# Patient Record
Sex: MALE | Race: WHITE | Employment: UNEMPLOYED | URBAN - NONMETROPOLITAN AREA
[De-identification: names, ages, dates, MRNs, and addresses within clinical notes are randomized per-mention and may not be internally consistent; named-entity substitution may affect disease eponyms.]

---
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
## 2023-02-01 PROBLEM — F84.0 ACTIVE AUTISTIC DISORDER: Status: ACTIVE | Noted: 2022-08-13

## 2023-07-06 ENCOUNTER — HOSPITAL ENCOUNTER (EMERGENCY)
Age: 23
Discharge: HOME | End: 2023-07-06
Attending: EMERGENCY MEDICINE
Payer: MEDICAID

## 2023-07-06 VITALS
RESPIRATION RATE: 19 BRPM | OXYGEN SATURATION: 97 % | TEMPERATURE: 99.5 F | SYSTOLIC BLOOD PRESSURE: 130 MMHG | HEART RATE: 90 BPM | DIASTOLIC BLOOD PRESSURE: 85 MMHG

## 2023-07-06 DIAGNOSIS — R56.9 SEIZURE (HCC): Primary | ICD-10-CM

## 2023-07-06 DIAGNOSIS — G40.909 SEIZURE DISORDER (HCC): ICD-10-CM

## 2023-07-06 LAB
ALBUMIN SERPL-MCNC: 4.3 G/DL (ref 3.5–5.2)
ALBUMIN/GLOB SERPL: 1.7 {RATIO} (ref 1–2.5)
ALP SERPL-CCNC: 76 U/L (ref 40–129)
ALT SERPL-CCNC: 16 U/L (ref 5–41)
ANION GAP SERPL CALCULATED.3IONS-SCNC: 17 MMOL/L (ref 9–17)
AST SERPL-CCNC: 17 U/L
BASOPHILS # BLD: 0.1 K/UL (ref 0–0.2)
BASOPHILS NFR BLD: 1 % (ref 0–2)
BILIRUB SERPL-MCNC: 0.3 MG/DL (ref 0.3–1.2)
BUN SERPL-MCNC: 9 MG/DL (ref 6–20)
CALCIUM SERPL-MCNC: 9 MG/DL (ref 8.6–10.4)
CHLORIDE SERPL-SCNC: 103 MMOL/L (ref 98–107)
CO2 SERPL-SCNC: 22 MMOL/L (ref 20–31)
CREAT SERPL-MCNC: 0.88 MG/DL (ref 0.7–1.2)
EOSINOPHIL # BLD: 0.4 K/UL (ref 0–0.4)
EOSINOPHILS RELATIVE PERCENT: 4 % (ref 1–4)
ERYTHROCYTE [DISTWIDTH] IN BLOOD BY AUTOMATED COUNT: 13.7 % (ref 12.5–15.4)
GFR SERPL CREATININE-BSD FRML MDRD: >60 ML/MIN/1.73M2
GLUCOSE BLD-MCNC: 141 MG/DL (ref 75–110)
GLUCOSE SERPL-MCNC: 126 MG/DL (ref 70–99)
HCT VFR BLD AUTO: 43.4 % (ref 41–53)
HGB BLD-MCNC: 14.9 G/DL (ref 13.5–17.5)
LYMPHOCYTES # BLD: 21 % (ref 25–45)
LYMPHOCYTES NFR BLD: 2.1 K/UL (ref 1–4.8)
MAGNESIUM SERPL-MCNC: 2.3 MG/DL (ref 1.6–2.6)
MCH RBC QN AUTO: 31.7 PG (ref 26–34)
MCHC RBC AUTO-ENTMCNC: 34.2 G/DL (ref 31–37)
MCV RBC AUTO: 92.5 FL (ref 80–100)
MONOCYTES NFR BLD: 0.7 K/UL (ref 0.1–1.4)
MONOCYTES NFR BLD: 7 % (ref 2–8)
NEUTROPHILS NFR BLD: 67 % (ref 34–64)
NEUTS SEG NFR BLD: 6.6 K/UL (ref 1.8–7.7)
PLATELET # BLD AUTO: 204 K/UL (ref 140–450)
PMV BLD AUTO: 8.5 FL (ref 6–12)
POTASSIUM SERPL-SCNC: 4.1 MMOL/L (ref 3.7–5.3)
PROT SERPL-MCNC: 6.9 G/DL (ref 6.4–8.3)
RBC # BLD AUTO: 4.7 M/UL (ref 4.5–5.9)
SODIUM SERPL-SCNC: 142 MMOL/L (ref 135–144)
WBC OTHER # BLD: 9.9 K/UL (ref 4.5–13.5)

## 2023-07-06 PROCEDURE — 99284 EMERGENCY DEPT VISIT MOD MDM: CPT

## 2023-07-06 PROCEDURE — 80053 COMPREHEN METABOLIC PANEL: CPT

## 2023-07-06 PROCEDURE — 83735 ASSAY OF MAGNESIUM: CPT

## 2023-07-06 PROCEDURE — 2580000003 HC RX 258: Performed by: EMERGENCY MEDICINE

## 2023-07-06 PROCEDURE — 85027 COMPLETE CBC AUTOMATED: CPT

## 2023-07-06 PROCEDURE — 36415 COLL VENOUS BLD VENIPUNCTURE: CPT

## 2023-07-06 PROCEDURE — 82947 ASSAY GLUCOSE BLOOD QUANT: CPT

## 2023-07-06 RX ORDER — 0.9 % SODIUM CHLORIDE 0.9 %
1000 INTRAVENOUS SOLUTION INTRAVENOUS ONCE
Status: COMPLETED | OUTPATIENT
Start: 2023-07-06 | End: 2023-07-06

## 2023-07-06 RX ORDER — FOLIC ACID 1 MG/1
1 TABLET ORAL DAILY
COMMUNITY

## 2023-07-06 RX ORDER — LORAZEPAM 1 MG/1
1 TABLET ORAL 2 TIMES DAILY
COMMUNITY

## 2023-07-06 RX ADMIN — SODIUM CHLORIDE 1000 ML: 9 INJECTION, SOLUTION INTRAVENOUS at 18:01

## 2023-07-06 ASSESSMENT — PAIN - FUNCTIONAL ASSESSMENT: PAIN_FUNCTIONAL_ASSESSMENT: NONE - DENIES PAIN

## 2023-07-06 NOTE — ED PROVIDER NOTES
[facility] Emergency Department  [id number] [facility]. [facility] [address]  Phone: [phone]  Fax: [address]      Pt Name: Morenita Conway  MRN: [id number]  [address] [place] 2000  Date of evaluation: 7/6/2023    CHIEF COMPLAINT       Chief Complaint   Patient presents with    Seizures     Pt had seizure yesterday; pt.,had 3 today. No meds given by EMS       HISTORY OF PRESENT ILLNESS    Morenita Conway is a 26 y.o. male who presents with a history of seizure per the EMS. Patient does have a history of seizure disorder unknown what medications that he takes or has not taken. They state that he has had several seizures in the last 24 hours    REVIEW OF SYSTEMS     Obtained due to his postictal state. PAST MEDICAL HISTORY    has a past medical history of Autism, COVID-19, Depression, and Epilepsy ([address]). SURGICAL HISTORY      has a past surgical history that includes Forearm fracture surgery (Left). CURRENT MEDICATIONS       Previous Medications    ARIPIPRAZOLE (ABILIFY) 15 MG TABLET    1 tablet nightly Takes at 6 pm    CLOBAZAM (ONFI) 10 MG TABS TABLET    1 tablet 2 times daily. DICYCLOMINE (BENTYL) 20 MG TABLET    Take 1 tablet by mouth 4 times daily as needed (abdominal cramps)    FOLIC ACID (FOLVITE) 1 MG TABLET    Take 1 tablet by mouth daily Unsure of dose    HYDROXYZINE (ATARAX) 50 MG TABLET    Take 50 mg by mouth in the morning, at noon, and at bedtime 2 tabs in morning, 1 tab afternoon    LACOSAMIDE (VIMPAT) 150 MG TABS TABLET    Take 200 mg by mouth 2 times daily. LORAZEPAM (ATIVAN) 1 MG TABLET    Take 1 tablet by mouth in the morning and at bedtime. Max Daily Amount: 2 mg    RISPERIDONE (RISPERDAL) 0.5 MG TABLET    Take 2 tablets by mouth 2 times daily    TRAZODONE (DESYREL) 50 MG TABLET    Take 1 tablet by mouth daily 1-2 QHS PRN       ALLERGIES     is allergic to bee venom and beeswax.     FAMILY HISTORY     He indicated that his

## 2023-07-18 ENCOUNTER — TELEPHONE (OUTPATIENT)
Dept: FAMILY MEDICINE CLINIC | Age: 23
End: 2023-07-18

## 2023-07-18 ENCOUNTER — OFFICE VISIT (OUTPATIENT)
Dept: FAMILY MEDICINE CLINIC | Age: 23
End: 2023-07-18
Payer: MEDICAID

## 2023-07-18 VITALS
WEIGHT: 196.4 LBS | OXYGEN SATURATION: 97 % | HEART RATE: 78 BPM | SYSTOLIC BLOOD PRESSURE: 128 MMHG | HEIGHT: 68 IN | DIASTOLIC BLOOD PRESSURE: 88 MMHG | BODY MASS INDEX: 29.77 KG/M2

## 2023-07-18 DIAGNOSIS — E53.8 FOLIC ACID DEFICIENCY: ICD-10-CM

## 2023-07-18 DIAGNOSIS — F84.0 ACTIVE AUTISTIC DISORDER: ICD-10-CM

## 2023-07-18 DIAGNOSIS — G40.909 NONINTRACTABLE EPILEPSY WITHOUT STATUS EPILEPTICUS, UNSPECIFIED EPILEPSY TYPE (HCC): Primary | ICD-10-CM

## 2023-07-18 DIAGNOSIS — R45.1 AGITATION: ICD-10-CM

## 2023-07-18 PROCEDURE — 99204 OFFICE O/P NEW MOD 45 MIN: CPT

## 2023-07-18 RX ORDER — ARIPIPRAZOLE 15 MG/1
15 TABLET ORAL DAILY
Qty: 30 TABLET | Refills: 0 | Status: SHIPPED | OUTPATIENT
Start: 2023-07-18 | End: 2023-08-17

## 2023-07-18 RX ORDER — FOLIC ACID 1 MG/1
1 TABLET ORAL DAILY
Qty: 30 TABLET | Refills: 2 | Status: SHIPPED | OUTPATIENT
Start: 2023-07-18 | End: 2023-10-16

## 2023-07-18 RX ORDER — RISPERIDONE 1 MG/1
TABLET ORAL
Qty: 90 TABLET | Refills: 0 | Status: SHIPPED | OUTPATIENT
Start: 2023-07-18 | End: 2023-08-17

## 2023-07-18 RX ORDER — DIAZEPAM 2.5 MG/.5ML
0.2 GEL RECTAL
Qty: 15 EACH | Refills: 0 | Status: SHIPPED | OUTPATIENT
Start: 2023-07-18 | End: 2023-07-18

## 2023-07-18 RX ORDER — CLOBAZAM 10 MG/1
20 TABLET ORAL 2 TIMES DAILY
Qty: 120 TABLET | Refills: 0 | Status: SHIPPED | OUTPATIENT
Start: 2023-07-18 | End: 2023-08-17

## 2023-07-18 RX ORDER — LORAZEPAM 1 MG/1
1 TABLET ORAL 2 TIMES DAILY PRN
Qty: 20 TABLET | Refills: 0 | Status: SHIPPED | OUTPATIENT
Start: 2023-07-18 | End: 2023-07-28

## 2023-07-18 SDOH — ECONOMIC STABILITY: FOOD INSECURITY: WITHIN THE PAST 12 MONTHS, YOU WORRIED THAT YOUR FOOD WOULD RUN OUT BEFORE YOU GOT MONEY TO BUY MORE.: NEVER TRUE

## 2023-07-18 SDOH — ECONOMIC STABILITY: TRANSPORTATION INSECURITY
IN THE PAST 12 MONTHS, HAS THE LACK OF TRANSPORTATION KEPT YOU FROM MEDICAL APPOINTMENTS OR FROM GETTING MEDICATIONS?: NO

## 2023-07-18 SDOH — ECONOMIC STABILITY: TRANSPORTATION INSECURITY
IN THE PAST 12 MONTHS, HAS LACK OF TRANSPORTATION KEPT YOU FROM MEETINGS, WORK, OR FROM GETTING THINGS NEEDED FOR DAILY LIVING?: NO

## 2023-07-18 SDOH — ECONOMIC STABILITY: FOOD INSECURITY: WITHIN THE PAST 12 MONTHS, THE FOOD YOU BOUGHT JUST DIDN'T LAST AND YOU DIDN'T HAVE MONEY TO GET MORE.: NEVER TRUE

## 2023-07-18 ASSESSMENT — PATIENT HEALTH QUESTIONNAIRE - PHQ9
SUM OF ALL RESPONSES TO PHQ QUESTIONS 1-9: 0
2. FEELING DOWN, DEPRESSED OR HOPELESS: 0
SUM OF ALL RESPONSES TO PHQ9 QUESTIONS 1 & 2: 0
SUM OF ALL RESPONSES TO PHQ QUESTIONS 1-9: 0
SUM OF ALL RESPONSES TO PHQ QUESTIONS 1-9: 0
1. LITTLE INTEREST OR PLEASURE IN DOING THINGS: 0
SUM OF ALL RESPONSES TO PHQ QUESTIONS 1-9: 0

## 2023-07-18 ASSESSMENT — SOCIAL DETERMINANTS OF HEALTH (SDOH): HOW HARD IS IT FOR YOU TO PAY FOR THE VERY BASICS LIKE FOOD, HOUSING, MEDICAL CARE, AND HEATING?: NOT HARD AT ALL

## 2023-07-18 NOTE — PATIENT INSTRUCTIONS
New Updates for My [facility]    Thank you for choosing US to give you the best care! [facility] ([facility]) is always trying to think of new ways to help their patients. We are asking all patients to try out the new digital registration that is now available through the new [facility], feel free to download today. Via the jean carlos you're now able to update your personal and registration information prior to your upcoming appointment. This will save you time once you arrive at the office to check-in, not to mention your information remains safe!! Many other perks come from signing up for an account, such as:  Requesting refills  Scheduling an appointment  Completing an E-Visit  Sending a message to the office/provider  Having access to your medication list  Paying your bill/copay prior to your appointment  Scheduling your yearly mammogram  Review your test results    If you are not familiar with the [facility], please ask one of us and we will be happy to answer any questions or help you set-up your account.

## 2023-07-18 NOTE — PROGRESS NOTES
as needed for Anxiety for up to 20 doses. Max Daily Amount: 2 mg     Dispense:  20 tablet     Refill:  0    risperiDONE (RISPERDAL) 1 MG tablet     Sig: Take 1 tablet by mouth every morning AND 2 tablets nightly. Dispense:  90 tablet     Refill:  0    folic acid (FOLVITE) 1 MG tablet     Sig: Take 1 tablet by mouth daily Unsure of dose     Dispense:  30 tablet     Refill:  2    cloBAZam (ONFI) 10 MG TABS tablet     Sig: Take 2 tablets by mouth 2 times daily for 30 days. Max Daily Amount: 40 mg     Dispense:  120 tablet     Refill:  0    ARIPiprazole (ABILIFY) 15 MG tablet     Sig: Take 1 tablet by mouth daily Takes at 6 pm     Dispense:  30 tablet     Refill:  0       Reviewed health maintenance, prior labs and imaging. Patient given educational materials - see patient instructions. Discussed use, benefit, and side effects of prescribed medications. Barriers to medication compliance addressed. All patient questions answered. Pt voiced understanding to plan of care. Instructed to continue medications as discussed, healthy diet and exercise. Patient agreed with treatment plan. Follow up as directed below. This note is created with the assistance of a speech-recognition program. While intending to generate a document that actually reflects the content of the visit, no guarantees can be provided that every mistake has been identified and corrected by editing.     Electronically signed by RILEY Gonzalez CNP, APRN-CNP on 7/20/2023 at 5:25 PM

## 2023-07-20 ASSESSMENT — ENCOUNTER SYMPTOMS
SINUS PAIN: 0
SINUS PRESSURE: 0
ABDOMINAL PAIN: 0
VOMITING: 0
WHEEZING: 0
EYE REDNESS: 0
SORE THROAT: 0
COUGH: 0
EYE ITCHING: 0
DIARRHEA: 0
EYE DISCHARGE: 0
TROUBLE SWALLOWING: 0
CHEST TIGHTNESS: 0
NAUSEA: 0
SHORTNESS OF BREATH: 0

## 2023-07-20 NOTE — ASSESSMENT & PLAN NOTE
Uncontrolled, continue current medications, continue current treatment plan, medication adherence emphasized and lifestyle modifications recommended. Will refer patient to neurology for further management.

## 2023-08-14 DIAGNOSIS — F84.0 ACTIVE AUTISTIC DISORDER: ICD-10-CM

## 2023-08-14 DIAGNOSIS — G40.909 NONINTRACTABLE EPILEPSY WITHOUT STATUS EPILEPTICUS, UNSPECIFIED EPILEPSY TYPE (HCC): ICD-10-CM

## 2023-08-14 RX ORDER — ARIPIPRAZOLE 15 MG/1
TABLET ORAL
Qty: 28 TABLET | Refills: 0 | Status: SHIPPED | OUTPATIENT
Start: 2023-08-14

## 2023-08-14 RX ORDER — RISPERIDONE 1 MG/1
TABLET ORAL
Qty: 90 TABLET | Refills: 0 | Status: SHIPPED | OUTPATIENT
Start: 2023-08-14 | End: 2023-09-13

## 2023-08-14 RX ORDER — CLOBAZAM 10 MG/1
TABLET ORAL
Qty: 112 TABLET | OUTPATIENT
Start: 2023-08-14

## 2023-08-14 NOTE — TELEPHONE ENCOUNTER
Seizure medications need to be prescribed and managed by Neurology as he continues to have frequent seizures. Patient also needs to establish care with mental health for medication management.

## 2023-08-14 NOTE — TELEPHONE ENCOUNTER
Alesia Delgadillo is calling to request a refill on the following medication(s):    Medication Request:  Requested Prescriptions     Pending Prescriptions Disp Refills    ARIPiprazole (ABILIFY) 15 MG tablet [Pharmacy Med Name: ARIPiprazole 15MG TABS*] 28 tablet      Sig: TAKE 1 TABLET BY MOUTH DAILY TAKES AT 6 IN THE EVENING    cloBAZam (ONFI) 10 MG TABS tablet [Pharmacy Med Name: cloBAZam 10MG TABS*] 112 tablet      Sig: TAKE 2 TABLETS BY MOUTH TWICE A DAY    risperiDONE (RISPERDAL) 1 MG tablet [Pharmacy Med Name: risperiDONE 1MG TABS*] 84 tablet      Sig: TAKE 1 TABLET BY MOUTH EVERY MORNING AND 2 TABLETS NIGHTLY.        Last Visit Date (If Applicable):  5/34/8928    Next Visit Date:    1/23/2024

## 2023-08-21 DIAGNOSIS — G40.909 NONINTRACTABLE EPILEPSY WITHOUT STATUS EPILEPTICUS, UNSPECIFIED EPILEPSY TYPE (HCC): ICD-10-CM

## 2023-08-21 RX ORDER — CLOBAZAM 10 MG/1
TABLET ORAL
Qty: 112 TABLET | OUTPATIENT
Start: 2023-08-21

## 2023-08-21 NOTE — TELEPHONE ENCOUNTER
Sweetie Hills is calling to request a refill on the following medication(s):    Medication Request:  Requested Prescriptions     Pending Prescriptions Disp Refills    cloBAZam (ONFI) 10 MG TABS tablet [Pharmacy Med Name: cloBAZam 10MG TABS*] 112 tablet      Sig: TAKE 2 TABLETS BY MOUTH TWICE A DAY       Last Visit Date (If Applicable):  8/89/4954    Next Visit Date:    1/23/2024

## 2023-09-12 DIAGNOSIS — F84.0 ACTIVE AUTISTIC DISORDER: ICD-10-CM

## 2023-09-12 RX ORDER — RISPERIDONE 1 MG/1
TABLET ORAL
Qty: 90 TABLET | Refills: 2 | Status: SHIPPED | OUTPATIENT
Start: 2023-09-12 | End: 2023-12-11

## 2023-09-12 RX ORDER — ARIPIPRAZOLE 15 MG/1
TABLET ORAL
Qty: 28 TABLET | Refills: 1 | Status: SHIPPED | OUTPATIENT
Start: 2023-09-12

## 2023-10-31 DIAGNOSIS — E53.8 FOLIC ACID DEFICIENCY: ICD-10-CM

## 2023-11-01 RX ORDER — FOLIC ACID 1 MG/1
1000 TABLET ORAL DAILY
Qty: 28 TABLET | Refills: 0 | Status: SHIPPED | OUTPATIENT
Start: 2023-11-01

## 2023-11-01 NOTE — TELEPHONE ENCOUNTER
Requested Prescriptions     Pending Prescriptions Disp Refills    folic acid (FOLVITE) 1 MG tablet [Pharmacy Med Name: Folic Acid 1MG TABS] 28 tablet 0     Sig: TAKE 1 TABLET BY MOUTH DAILY

## 2023-11-28 DIAGNOSIS — E53.8 FOLIC ACID DEFICIENCY: ICD-10-CM

## 2023-11-28 RX ORDER — FOLIC ACID 1 MG/1
1000 TABLET ORAL DAILY
Qty: 28 TABLET | Refills: 5 | Status: SHIPPED | OUTPATIENT
Start: 2023-11-28

## 2023-11-28 NOTE — TELEPHONE ENCOUNTER
Jerrell Bell is calling to request a refill on the following medication(s):    Medication Request:  Requested Prescriptions     Pending Prescriptions Disp Refills    folic acid (FOLVITE) 1 MG tablet [Pharmacy Med Name: Folic Acid 1MG TABS] 28 tablet 0     Sig: TAKE 1 TABLET BY MOUTH DAILY       Last Visit Date (If Applicable):  7/18/2023    Next Visit Date:    1/23/2024

## 2024-07-07 ENCOUNTER — HOSPITAL ENCOUNTER (INPATIENT)
Age: 24
LOS: 2 days | Discharge: HOME | DRG: 101 | End: 2024-07-09
Attending: EMERGENCY MEDICINE | Admitting: PSYCHIATRY & NEUROLOGY
Payer: COMMERCIAL

## 2024-07-07 ENCOUNTER — APPOINTMENT (OUTPATIENT)
Dept: CT IMAGING | Age: 24
DRG: 101 | End: 2024-07-07
Payer: COMMERCIAL

## 2024-07-07 ENCOUNTER — APPOINTMENT (OUTPATIENT)
Dept: GENERAL RADIOLOGY | Age: 24
DRG: 101 | End: 2024-07-07
Payer: COMMERCIAL

## 2024-07-07 DIAGNOSIS — G40.919 BREAKTHROUGH SEIZURE (HCC): Primary | ICD-10-CM

## 2024-07-07 PROBLEM — G40.909 SEIZURE DISORDER (HCC): Status: ACTIVE | Noted: 2024-07-07

## 2024-07-07 LAB
ANION GAP SERPL CALCULATED.3IONS-SCNC: 17 MMOL/L (ref 9–16)
BUN SERPL-MCNC: 11 MG/DL (ref 6–20)
CALCIUM SERPL-MCNC: 9.2 MG/DL (ref 8.6–10.4)
CHLORIDE SERPL-SCNC: 102 MMOL/L (ref 98–107)
CO2 SERPL-SCNC: 22 MMOL/L (ref 20–31)
CREAT SERPL-MCNC: 1 MG/DL (ref 0.7–1.2)
GFR, ESTIMATED: >90 ML/MIN/1.73M2
GLUCOSE BLD-MCNC: 80 MG/DL (ref 75–110)
GLUCOSE SERPL-MCNC: 100 MG/DL (ref 74–99)
MAGNESIUM SERPL-MCNC: 2 MG/DL (ref 1.6–2.6)
POTASSIUM SERPL-SCNC: 4 MMOL/L (ref 3.7–5.3)
SODIUM SERPL-SCNC: 141 MMOL/L (ref 136–145)
TROPONIN I SERPL HS-MCNC: 9 NG/L (ref 0–22)
VALPROATE SERPL-MCNC: 114 UG/ML (ref 50–125)

## 2024-07-07 PROCEDURE — 95819 EEG AWAKE AND ASLEEP: CPT

## 2024-07-07 PROCEDURE — 6360000002 HC RX W HCPCS

## 2024-07-07 PROCEDURE — 99223 1ST HOSP IP/OBS HIGH 75: CPT | Performed by: PSYCHIATRY & NEUROLOGY

## 2024-07-07 PROCEDURE — 93005 ELECTROCARDIOGRAM TRACING: CPT | Performed by: EMERGENCY MEDICINE

## 2024-07-07 PROCEDURE — 94761 N-INVAS EAR/PLS OXIMETRY MLT: CPT

## 2024-07-07 PROCEDURE — 80048 BASIC METABOLIC PNL TOTAL CA: CPT

## 2024-07-07 PROCEDURE — 71045 X-RAY EXAM CHEST 1 VIEW: CPT

## 2024-07-07 PROCEDURE — 6370000000 HC RX 637 (ALT 250 FOR IP)

## 2024-07-07 PROCEDURE — 82947 ASSAY GLUCOSE BLOOD QUANT: CPT

## 2024-07-07 PROCEDURE — 99285 EMERGENCY DEPT VISIT HI MDM: CPT

## 2024-07-07 PROCEDURE — 84484 ASSAY OF TROPONIN QUANT: CPT

## 2024-07-07 PROCEDURE — 95819 EEG AWAKE AND ASLEEP: CPT | Performed by: PSYCHIATRY & NEUROLOGY

## 2024-07-07 PROCEDURE — 1200000000 HC SEMI PRIVATE

## 2024-07-07 PROCEDURE — 70450 CT HEAD/BRAIN W/O DYE: CPT

## 2024-07-07 PROCEDURE — 2580000003 HC RX 258

## 2024-07-07 PROCEDURE — 83735 ASSAY OF MAGNESIUM: CPT

## 2024-07-07 PROCEDURE — 80164 ASSAY DIPROPYLACETIC ACD TOT: CPT

## 2024-07-07 RX ORDER — ONDANSETRON 4 MG/1
4 TABLET, ORALLY DISINTEGRATING ORAL EVERY 8 HOURS PRN
Status: DISCONTINUED | OUTPATIENT
Start: 2024-07-07 | End: 2024-07-09 | Stop reason: HOSPADM

## 2024-07-07 RX ORDER — POLYETHYLENE GLYCOL 3350 17 G/17G
17 POWDER, FOR SOLUTION ORAL DAILY PRN
Status: DISCONTINUED | OUTPATIENT
Start: 2024-07-07 | End: 2024-07-09 | Stop reason: HOSPADM

## 2024-07-07 RX ORDER — SODIUM CHLORIDE 9 MG/ML
INJECTION, SOLUTION INTRAVENOUS PRN
Status: DISCONTINUED | OUTPATIENT
Start: 2024-07-07 | End: 2024-07-09 | Stop reason: HOSPADM

## 2024-07-07 RX ORDER — ACETAMINOPHEN 650 MG/1
650 SUPPOSITORY RECTAL EVERY 6 HOURS PRN
Status: DISCONTINUED | OUTPATIENT
Start: 2024-07-07 | End: 2024-07-09 | Stop reason: HOSPADM

## 2024-07-07 RX ORDER — ENOXAPARIN SODIUM 100 MG/ML
40 INJECTION SUBCUTANEOUS DAILY
Status: DISCONTINUED | OUTPATIENT
Start: 2024-07-07 | End: 2024-07-09 | Stop reason: HOSPADM

## 2024-07-07 RX ORDER — VALPROIC ACID 250 MG/1
1000 CAPSULE, LIQUID FILLED ORAL 2 TIMES DAILY
Status: DISCONTINUED | OUTPATIENT
Start: 2024-07-07 | End: 2024-07-09 | Stop reason: HOSPADM

## 2024-07-07 RX ORDER — SODIUM CHLORIDE 0.9 % (FLUSH) 0.9 %
5-40 SYRINGE (ML) INJECTION EVERY 12 HOURS SCHEDULED
Status: DISCONTINUED | OUTPATIENT
Start: 2024-07-07 | End: 2024-07-09 | Stop reason: HOSPADM

## 2024-07-07 RX ORDER — VALPROIC ACID 250 MG/1
750 CAPSULE, LIQUID FILLED ORAL DAILY
Status: DISCONTINUED | OUTPATIENT
Start: 2024-07-07 | End: 2024-07-09 | Stop reason: HOSPADM

## 2024-07-07 RX ORDER — CLOBAZAM 10 MG/1
20 TABLET ORAL 2 TIMES DAILY
Status: DISCONTINUED | OUTPATIENT
Start: 2024-07-07 | End: 2024-07-09 | Stop reason: HOSPADM

## 2024-07-07 RX ORDER — POTASSIUM CHLORIDE 20 MEQ/1
40 TABLET, EXTENDED RELEASE ORAL PRN
Status: DISCONTINUED | OUTPATIENT
Start: 2024-07-07 | End: 2024-07-09 | Stop reason: HOSPADM

## 2024-07-07 RX ORDER — RISPERIDONE 1 MG/1
1 TABLET ORAL DAILY
Status: DISCONTINUED | OUTPATIENT
Start: 2024-07-07 | End: 2024-07-09 | Stop reason: HOSPADM

## 2024-07-07 RX ORDER — ARIPIPRAZOLE 15 MG/1
15 TABLET ORAL DAILY
Status: DISCONTINUED | OUTPATIENT
Start: 2024-07-07 | End: 2024-07-09 | Stop reason: HOSPADM

## 2024-07-07 RX ORDER — ACETAMINOPHEN 325 MG/1
650 TABLET ORAL EVERY 6 HOURS PRN
Status: DISCONTINUED | OUTPATIENT
Start: 2024-07-07 | End: 2024-07-09 | Stop reason: HOSPADM

## 2024-07-07 RX ORDER — MAGNESIUM SULFATE IN WATER 40 MG/ML
2000 INJECTION, SOLUTION INTRAVENOUS PRN
Status: DISCONTINUED | OUTPATIENT
Start: 2024-07-07 | End: 2024-07-09 | Stop reason: HOSPADM

## 2024-07-07 RX ORDER — ONDANSETRON 2 MG/ML
4 INJECTION INTRAMUSCULAR; INTRAVENOUS EVERY 6 HOURS PRN
Status: DISCONTINUED | OUTPATIENT
Start: 2024-07-07 | End: 2024-07-09 | Stop reason: HOSPADM

## 2024-07-07 RX ORDER — SODIUM CHLORIDE 0.9 % (FLUSH) 0.9 %
5-40 SYRINGE (ML) INJECTION PRN
Status: DISCONTINUED | OUTPATIENT
Start: 2024-07-07 | End: 2024-07-09 | Stop reason: HOSPADM

## 2024-07-07 RX ORDER — LACOSAMIDE 100 MG/1
300 TABLET ORAL 2 TIMES DAILY
Status: DISCONTINUED | OUTPATIENT
Start: 2024-07-07 | End: 2024-07-09 | Stop reason: HOSPADM

## 2024-07-07 RX ORDER — RISPERIDONE 2 MG/1
2 TABLET ORAL NIGHTLY
Status: DISCONTINUED | OUTPATIENT
Start: 2024-07-07 | End: 2024-07-09 | Stop reason: HOSPADM

## 2024-07-07 RX ORDER — POTASSIUM CHLORIDE 7.45 MG/ML
10 INJECTION INTRAVENOUS PRN
Status: DISCONTINUED | OUTPATIENT
Start: 2024-07-07 | End: 2024-07-09 | Stop reason: HOSPADM

## 2024-07-07 RX ADMIN — ENOXAPARIN SODIUM 40 MG: 100 INJECTION SUBCUTANEOUS at 10:28

## 2024-07-07 RX ADMIN — VALPROIC ACID 1000 MG: 250 CAPSULE, LIQUID FILLED ORAL at 20:57

## 2024-07-07 RX ADMIN — LACOSAMIDE 300 MG: 100 TABLET, FILM COATED ORAL at 20:57

## 2024-07-07 RX ADMIN — CLOBAZAM 20 MG: 10 TABLET ORAL at 20:57

## 2024-07-07 RX ADMIN — VALPROIC ACID 1000 MG: 250 CAPSULE, LIQUID FILLED ORAL at 10:27

## 2024-07-07 RX ADMIN — SODIUM CHLORIDE, PRESERVATIVE FREE 10 ML: 5 INJECTION INTRAVENOUS at 21:04

## 2024-07-07 RX ADMIN — RISPERIDONE 1 MG: 2 TABLET, FILM COATED ORAL at 10:28

## 2024-07-07 RX ADMIN — LACOSAMIDE 300 MG: 100 TABLET, FILM COATED ORAL at 10:27

## 2024-07-07 RX ADMIN — CLOBAZAM 20 MG: 10 TABLET ORAL at 10:27

## 2024-07-07 RX ADMIN — ACETAMINOPHEN 650 MG: 325 TABLET ORAL at 11:11

## 2024-07-07 RX ADMIN — RISPERIDONE 2 MG: 2 TABLET, FILM COATED ORAL at 20:57

## 2024-07-07 ASSESSMENT — PAIN - FUNCTIONAL ASSESSMENT: PAIN_FUNCTIONAL_ASSESSMENT: 0-10

## 2024-07-07 ASSESSMENT — PAIN SCALES - GENERAL
PAINLEVEL_OUTOF10: 10
PAINLEVEL_OUTOF10: 10

## 2024-07-07 NOTE — PROCEDURES
Daily Kwan Thomas MD   40 mg at 07/07/24 1028    cloBAZam (ONFI) tablet 20 mg  20 mg Oral BID Kwan Thomas MD   20 mg at 07/07/24 1027    lacosamide (VIMPAT) tablet 300 mg  300 mg Oral BID Kwan Thomas MD   300 mg at 07/07/24 1027    risperiDONE (RISPERDAL) tablet 1 mg  1 mg Oral Daily Kwan Thomas MD   1 mg at 07/07/24 1028    risperiDONE (RISPERDAL) tablet 2 mg  2 mg Oral Nightly Kwan Thomas MD        ARIPiprazole (ABILIFY) tablet 15 mg  15 mg Oral Daily Kwan Thomas MD        valproic acid (DEPAKENE) capsule 1,000 mg  1,000 mg Oral BID Josué Davies MD   1,000 mg at 07/07/24 1027    valproic acid (DEPAKENE) capsule 750 mg  750 mg Oral Daily Josué Davies MD         Current Outpatient Medications   Medication Sig Dispense Refill    folic acid (FOLVITE) 1 MG tablet TAKE 1 TABLET BY MOUTH DAILY 28 tablet 5    risperiDONE (RISPERDAL) 1 MG tablet TAKE 1 TABLET BY MOUTH EVERY MORNING AND 2 TABLETS NIGHTLY. 90 tablet 2    ARIPiprazole (ABILIFY) 15 MG tablet TAKE 1 TABLET BY MOUTH DAILY TAKES AT 6 IN THE EVENING 28 tablet 1    Triamcinolone Acetonide (NASACORT ALLERGY 24HR NA) by Nasal route      diazePAM (DIASTAT) 2.5 MG GEL Place 15 mg rectally once as needed (Seizure activity) for up to 1 dose. Max Daily Amount: 15 mg 15 each 0    cloBAZam (ONFI) 10 MG TABS tablet Take 2 tablets by mouth 2 times daily for 30 days. Max Daily Amount: 40 mg 120 tablet 0    lacosamide (VIMPAT) 150 MG TABS tablet Take 200 mg by mouth 2 times daily.        Technical Description: This is a 21 channel digital EEG recording with time-locked video. Electrodes were placed in accordance with the 10-20 International System of Electrode Placement. Single lead EKG monitoring as well as temporal electrodes were included.    The patient was not sleep deprived. This recording was obtained during wakefulness.    EEG Description: The dominant background activity during maximal recorded wakefulness 
Denies tobacco/alcohol use

## 2024-07-07 NOTE — ED NOTES
Attempted to medicate and go over patient mediation. Patient is sleeping. Patient will briefly wake to verbal stimuli and sternal rub and then go back to sleep. Patient wakes to loud verbal stimuli and will say \"what\" then fall back asleep. RN notified Suzie ARRIAGA. Patient unable to stay awake long enough to verify medications/administration. Guards at bedside reports this is ongoing since coming back from [place].   
ED to inpatient nurses report      Chief Complaint:  Chief Complaint   Patient presents with    Seizures     Present to ED from: [facility]    MOA:     LOC: alert and orientated to name, place, date  Mobility: Requires assistance * 1  Oxygen Baseline: RA    Current needs required: RA   Pending ED orders: none  Present condition: stable, alert and oriented     Why did the patient come to the ED? Seizures   What is the plan? Admission, EEG  Any procedures or intervention occur? Labs, consults, VS  Any safety concerns?? Seizure precautions    Mental Status:  Level of Consciousness: Alert (0)    Psych Assessment:   Psychosocial  Psychosocial (WDL): Within Defined Limits  Vital signs   Vitals:    07/07/24 0415 07/07/24 0442   BP: 139/85    Pulse: 94 94   Resp: 16    Temp: 98.2 °F (36.8 °C)    TempSrc: Oral    SpO2: 96%    Weight: 90.7 kg (200 lb)    Height: 1.829 m (6')         Vitals:  Patient Vitals for the past 24 hrs:   BP Temp Temp src Pulse Resp SpO2 Height Weight   07/07/24 0442 -- -- -- 94 -- -- -- --   07/07/24 0415 139/85 98.2 °F (36.8 °C) Oral 94 16 96 % 1.829 m (6') 90.7 kg (200 lb)      Visit Vitals  /85   Pulse 94   Temp 98.2 °F (36.8 °C) (Oral)   Resp 16   Ht 1.829 m (6')   Wt 90.7 kg (200 lb)   SpO2 96%   BMI 27.12 kg/m²        LDAs:   Peripheral IV 07/07/24 Right Antecubital (Active)       Ambulatory Status:  No data recorded    Diagnosis:  DISPOSITION Admitted 07/07/2024 05:15:44 AM   Final diagnoses:   None        Consults:  IP CONSULT TO NEUROLOGY     Treatment Team:   Treatment Team: Attending Provider: Edenilson Barriga MD; Resident: Brittany Jaime MD; Registered Nurse: Duane, Savannah, RN; Consulting Physician: Edenilson Barriga MD    Treatment:  ED Course as of 07/07/24 0518   Sun Jul 07, 2024   0507 Magnesium: 2.0 [NS]      ED Course User Index  [NS] Brittany Jaime MD          Skin Assessment:        Pain Score:  Pain Assessment  Pain Assessment: 0-10  Pain Level: 
ED to inpatient nurses report      Chief Complaint:  Chief Complaint   Patient presents with    Seizures     Present to ED from: group home to detention to [facility] ED    MOA:     LOC: alert and orientated to name and place  Mobility: Independent  Oxygen Baseline: room air    Current needs required: room air   Pending ED orders: none  Present condition: stable    Why did the patient come to the ED? Seizure occurring at detention  What is the plan? EEG, meds  Any procedures or intervention occur? IV, CT, EEG, oral home meds  Any safety concerns?? none    Mental Status:  Level of Consciousness: Alert (0)    Psych Assessment:   Psychosocial  Psychosocial (WDL): Within Defined Limits  Vital signs   Vitals:    07/07/24 1346 07/07/24 1356 07/07/24 1415 07/07/24 1430   BP: 91/60 92/62 92/65 98/62   Pulse: 60 59 56 55   Resp: 13 13 13 12   Temp:       TempSrc:       SpO2: 97% 97% 97% 97%   Weight:       Height:            Vitals:  Patient Vitals for the past 24 hrs:   BP Temp Temp src Pulse Resp SpO2 Height Weight   07/07/24 1430 98/62 -- -- 55 12 97 % -- --   07/07/24 1415 92/65 -- -- 56 13 97 % -- --   07/07/24 1356 92/62 -- -- 59 13 97 % -- --   07/07/24 1346 91/60 -- -- 60 13 97 % -- --   07/07/24 1331 (!) 91/54 -- -- 63 12 96 % -- --   07/07/24 1246 (!) 100/48 -- -- 61 11 96 % -- --   07/07/24 1131 127/78 -- -- 76 12 99 % -- --   07/07/24 1116 126/85 -- -- 73 15 98 % -- --   07/07/24 1101 127/71 -- -- 73 12 99 % -- --   07/07/24 1046 (!) 124/92 -- -- 83 13 96 % -- --   07/07/24 1020 -- -- -- 69 13 95 % -- --   07/07/24 1015 105/61 -- -- 51 11 98 % -- --   07/07/24 0955 100/64 -- -- (!) 47 10 99 % -- --   07/07/24 0940 (!) 98/59 -- -- 56 11 97 % -- --   07/07/24 0925 96/62 -- -- (!) 47 11 99 % -- --   07/07/24 0915 (!) 100/53 -- -- 51 12 97 % -- --   07/07/24 0855 103/66 -- -- 57 11 96 % -- --   07/07/24 0840 (!) 100/55 -- -- 58 11 97 % -- --   07/07/24 0825 99/62 -- -- 53 11 97 % -- --   07/07/24 0810 106/71 -- -- 55 12 96 % -- -- 
EEG at bedside  
EEG called, will be coming down to place patient onto EEG.   
Juanr received call from [facility] RN reporting patient's guardian/mother Wyatt Bell [phone] was told patient's there & she's trying to find him. Writer verified guardianship on [facility] site. Writer contacted Wyatt who stated patient resides at [address] [place] [address]. Wyatt stated patient should've never been arrested & wouldn't have had the normal staff been there. Wyatt asked for patient's legal status.  stated per RN notes, officers left with instructions for patient to appear in [facility] Court tomorrow in lieu of senior care. Writeellie stated, per RN note, warrant for arrest will be issued if patient doesn't present to court or hospital staff don't inform court of patient's admission status. Wyatt stated patient's normal group home staff, Harrison Jensen [phone], was out of state when this morning's incident happened. Wyatt stated Harrison is  & in charge of the home. Wyatt requested writer contact Harrison with update & contact her with decision/instruction to either contact the sergeant today or wait & contact the court tomorrow. Writer contacted Harrison, informed him patient's in [facility] ED as a boarder waiting for a bed upstairs. Harrison stated patient does better if he's with someone he knows & trusts. Harrison stated if the ED needs home staff to present to the ED or to speak to him, to call him any time. Harrison stated patient calms down just by his voice. Harrison stated intent to contact Wyatt to coordinate communication with the courts.  left voicemail for floor social work.   
Neuro at bedside  
Neuro residents at bedside. Not concerned about patient's HR dropping. EEG abnormal, residents spoke with neuro attending and no LTME necessary at this time.  
Neurology at bedside to evaluate pt  
POCT Glucose 116 mg/dl.  
Patient given warm meal tray. Patient resting comfortably and in no acute distress. Respirations even and nonlabored. Patient denies any needs at this time. Bed in lowest position. Call light within reach. Will continue to monitor.  
Patient's HR dropping into 40s, hit 39 bpm at one point. Admitting team notified.  
Per  over the phone from the nursing home, patient is initially from a group home where he resides due to his hx autism. Patient threw something at  and the worker wanted to press charges so that is why the patient was arrested. However, due to patient's hx autism, they are doubting this charge will hold up and are willing to release the patient with court order for tomorrow.  is requesting we consult case management to verify he can go back to the group home or if he needs another home.  
Pt arrived to ED through police custody from retirement   stated he had a seizure and has a hx of seizures  Pt stated he did hit his head  Pt stated he takes daily medication for his seizures  Pt connected to monitor, bp and pulse ox  IV access started with labs drawn  EKG completed and charted   Seizure precautions in place   Pt appears to be in no acute distress at this time  
Pt released from police custody from the ER. Pt is to appear in court tomorrow 07/08 at [facility]. Pt or RN will need to call the court to inform them of patient's admission status or pt will receive a warrant for his arrest per request by law enforcement. Pt mental status is altered at this time and is unable to do so himself, if more alert, RN will need to remind pt to call.    [facility] court  [phone]  
Report given to Suzie ARRIAGA, all questions answered   
Warm blankets given. Patient resting comfortably and in no acute distress. Respirations even and nonlabored. Patient denies any needs at this time. Bed in lowest position. Call light within reach. Will continue to monitor.  
1.829 m (6')   Wt 90.7 kg (200 lb)   SpO2 95%   BMI 27.12 kg/m²        LDAs:   Peripheral IV 07/07/24 Right Antecubital (Active)       Ambulatory Status:  No data recorded    Diagnosis:  DISPOSITION Admitted 07/07/2024 05:15:44 AM   Final diagnoses:   None        Consults:  IP CONSULT TO NEUROLOGY     Treatment Team:   Treatment Team: Attending Provider: Edenilson Barriga MD; Consulting Physician: Edenilson Barriga MD; Registered Nurse: Suzie Graham RN    Treatment:  ED Course as of 07/07/24 1154   Sun Jul 07, 2024   0507 Magnesium: 2.0 [NS]      ED Course User Index  [NS] Brittany Jaime MD          Skin Assessment:        Pain Score:  Pain Assessment  Pain Assessment: 0-10  Pain Level: 10      SOCIAL HISTORY       Social History     Socioeconomic History    Marital status:    Tobacco Use    Smoking status: Former     Types: Cigarettes    Smokeless tobacco: Never   Vaping Use    Vaping Use: Never used   Substance and Sexual Activity    Alcohol use: Never    Drug use: Not Currently     Types: Marijuana (Weed)     Social Determinants of Health     Financial Resource Strain: Low Risk  (7/18/2023)    Overall Financial Resource Strain (CARDIA)     Difficulty of Paying Living Expenses: Not hard at all   Transportation Needs: No Transportation Needs (7/18/2023)    PRAPARE - Transportation     Lack of Transportation (Medical): No     Lack of Transportation (Non-Medical): No   Housing Stability: Unknown (2/7/2023)    Housing Stability Vital Sign     Unstable Housing in the Last Year: No       FAMILY HISTORY       Family History   Problem Relation Age of Onset    Depression Mother     Other Mother        ALLERGIES     Bee venom and Beeswax    CURRENT MEDICATIONS       Previous Medications    ARIPIPRAZOLE (ABILIFY) 15 MG TABLET    TAKE 1 TABLET BY MOUTH DAILY TAKES AT 6 IN THE EVENING    CLOBAZAM (ONFI) 10 MG TABS TABLET    Take 2 tablets by mouth 2 times daily for 30 days. Max Daily Amount: 40

## 2024-07-07 NOTE — ED PROVIDER NOTES
[facility]  Emergency Department Encounter  Emergency Medicine Resident     Pt Name:Jerrell Bell  MRN: [id number]  Birthdate 2000  Date of evaluation: 7/7/24  PCP:  No primary care provider on file.  Note Started: 4:37 AM EDT      CHIEF COMPLAINT       Chief Complaint   Patient presents with    Seizures       HISTORY OF PRESENT ILLNESS  (Location/Symptom, Timing/Onset, Context/Setting, Quality, Duration, Modifying Factors, Severity.)      Jerrell Bell is a 24 y.o. male who presents with seizure-like activity while in custody.  Patient was seated in the chair and told afterwards he was going to have a seizure.  He was placed on the ground in the recovery position.  As per report seizure-like activity for approximately 1 minute.  Complaining of chest pain after after seizure activity states he is currently denies any chest pain or shortness of breath.  Patient states he is on Depakote which she took yesterday.  History of epilepsy autism.     PAST MEDICAL / SURGICAL / SOCIAL / FAMILY HISTORY      has a past medical history of Autism, COVID-19, Depression, and Epilepsy (HCC).     has a past surgical history that includes Forearm fracture surgery (Left).    Social History     Socioeconomic History    Marital status:      Spouse name: Not on file    Number of children: Not on file    Years of education: Not on file    Highest education level: Not on file   Occupational History    Not on file   Tobacco Use    Smoking status: Former     Types: Cigarettes    Smokeless tobacco: Never   Vaping Use    Vaping Use: Never used   Substance and Sexual Activity    Alcohol use: Never    Drug use: Not Currently     Types: Marijuana (Weed)    Sexual activity: Not on file   Other Topics Concern    Not on file   Social History Narrative    Not on file     Social Determinants of Health     Financial Resource Strain: Low Risk  (7/18/2023)    Overall Financial Resource Strain (CARDIA)     Difficulty of

## 2024-07-07 NOTE — ED PROVIDER NOTES
[facility]     Emergency Department     Faculty Attestation    I performed a history and physical examination of the patient and discussed management with the resident. I have reviewed and agree with the resident’s findings including all diagnostic interpretations, and treatment plans as written. Any areas of disagreement are noted on the chart. I was personally present for the key portions of any procedures. I have documented in the chart those procedures where I was not present during the key portions. I have reviewed the emergency nurses triage note. I agree with the chief complaint, past medical history, past surgical history, allergies, medications, social and family history as documented unless otherwise noted below. Documentation of the HPI, Physical Exam and Medical Decision Making performed by scribvenkat is based on my personal performance of the HPI, PE and MDM. For Physician Assistant/ Nurse Practitioner cases/documentation I have personally evaluated this patient and have completed at least one if not all key elements of the E/M (history, physical exam, and MDM). Additional findings are as noted.    Note Started: 4:20 AM EDT     25 yo M reported seizure with cp, no fever, no injury, no incontinence, patient reports being on Depakote,   PE vss flat affect, EOMI, no tongue abrasion, no cervical tenderness, crepitus, or deformity,  Radial pulse= d pedal pulse=     Trop 9, neuro consult, neuro admit,   Ct head -  EKG Interpretation    Interpreted by me  Normal sinus, heart rate 91, no ischemia, normal axis, QTc 425    CRITICAL CARE: There was a high probability of clinically significant/life threatening deterioration in this patient's condition which required my urgent intervention.  Total critical care time was 5 minutes.  This excludes any time for separately reportable procedures.       Julien Richards DO  07/07/24 0422

## 2024-07-08 LAB
ALBUMIN SERPL-MCNC: 4.2 G/DL (ref 3.5–5.2)
ALBUMIN/GLOB SERPL: 2 {RATIO} (ref 1–2.5)
ALP SERPL-CCNC: 64 U/L (ref 40–129)
ALT SERPL-CCNC: 18 U/L (ref 10–50)
ANION GAP SERPL CALCULATED.3IONS-SCNC: 12 MMOL/L (ref 9–16)
AST SERPL-CCNC: 41 U/L (ref 10–50)
BASOPHILS # BLD: 0.04 K/UL (ref 0–0.2)
BASOPHILS NFR BLD: 1 % (ref 0–2)
BILIRUB SERPL-MCNC: 0.9 MG/DL (ref 0–1.2)
BUN SERPL-MCNC: 17 MG/DL (ref 6–20)
CALCIUM SERPL-MCNC: 8.8 MG/DL (ref 8.6–10.4)
CHLORIDE SERPL-SCNC: 105 MMOL/L (ref 98–107)
CO2 SERPL-SCNC: 22 MMOL/L (ref 20–31)
CREAT SERPL-MCNC: 0.9 MG/DL (ref 0.7–1.2)
EKG ATRIAL RATE: 91 BPM
EKG P AXIS: 58 DEGREES
EKG P-R INTERVAL: 152 MS
EKG Q-T INTERVAL: 346 MS
EKG QRS DURATION: 86 MS
EKG QTC CALCULATION (BAZETT): 425 MS
EKG R AXIS: 15 DEGREES
EKG T AXIS: 27 DEGREES
EKG VENTRICULAR RATE: 91 BPM
EOSINOPHIL # BLD: 0.25 K/UL (ref 0–0.44)
EOSINOPHILS RELATIVE PERCENT: 3 % (ref 1–4)
ERYTHROCYTE [DISTWIDTH] IN BLOOD BY AUTOMATED COUNT: 13.4 % (ref 11.8–14.4)
GFR, ESTIMATED: >90 ML/MIN/1.73M2
GLUCOSE BLD-MCNC: 116 MG/DL (ref 75–110)
GLUCOSE BLD-MCNC: 92 MG/DL (ref 75–110)
GLUCOSE SERPL-MCNC: 80 MG/DL (ref 74–99)
HCT VFR BLD AUTO: 44.7 % (ref 40.7–50.3)
HGB BLD-MCNC: 14.6 G/DL (ref 13–17)
IMM GRANULOCYTES # BLD AUTO: 0.03 K/UL (ref 0–0.3)
IMM GRANULOCYTES NFR BLD: 0 %
LYMPHOCYTES NFR BLD: 2.48 K/UL (ref 1.1–3.7)
LYMPHOCYTES RELATIVE PERCENT: 34 % (ref 24–43)
MCH RBC QN AUTO: 31.8 PG (ref 25.2–33.5)
MCHC RBC AUTO-ENTMCNC: 32.7 G/DL (ref 28.4–34.8)
MCV RBC AUTO: 97.4 FL (ref 82.6–102.9)
MONOCYTES NFR BLD: 0.64 K/UL (ref 0.1–1.2)
MONOCYTES NFR BLD: 9 % (ref 3–12)
NEUTROPHILS NFR BLD: 53 % (ref 36–65)
NEUTS SEG NFR BLD: 3.87 K/UL (ref 1.5–8.1)
NRBC BLD-RTO: 0 PER 100 WBC
PLATELET # BLD AUTO: NORMAL K/UL (ref 138–453)
PLATELET, FLUORESCENCE: 162 K/UL (ref 138–453)
PLATELETS.RETICULATED NFR BLD AUTO: 2.5 % (ref 1.1–10.3)
POTASSIUM SERPL-SCNC: 4.4 MMOL/L (ref 3.7–5.3)
PROT SERPL-MCNC: 6.8 G/DL (ref 6.6–8.7)
RBC # BLD AUTO: 4.59 M/UL (ref 4.21–5.77)
SODIUM SERPL-SCNC: 139 MMOL/L (ref 136–145)
WBC OTHER # BLD: 7.3 K/UL (ref 3.5–11.3)

## 2024-07-08 PROCEDURE — 6370000000 HC RX 637 (ALT 250 FOR IP)

## 2024-07-08 PROCEDURE — 2580000003 HC RX 258

## 2024-07-08 PROCEDURE — 99232 SBSQ HOSP IP/OBS MODERATE 35: CPT | Performed by: PSYCHIATRY & NEUROLOGY

## 2024-07-08 PROCEDURE — 85025 COMPLETE CBC W/AUTO DIFF WBC: CPT

## 2024-07-08 PROCEDURE — 82947 ASSAY GLUCOSE BLOOD QUANT: CPT

## 2024-07-08 PROCEDURE — 97161 PT EVAL LOW COMPLEX 20 MIN: CPT

## 2024-07-08 PROCEDURE — 6360000002 HC RX W HCPCS

## 2024-07-08 PROCEDURE — 51798 US URINE CAPACITY MEASURE: CPT

## 2024-07-08 PROCEDURE — 97530 THERAPEUTIC ACTIVITIES: CPT

## 2024-07-08 PROCEDURE — 85055 RETICULATED PLATELET ASSAY: CPT

## 2024-07-08 PROCEDURE — 36415 COLL VENOUS BLD VENIPUNCTURE: CPT

## 2024-07-08 PROCEDURE — 1200000000 HC SEMI PRIVATE

## 2024-07-08 PROCEDURE — 80053 COMPREHEN METABOLIC PANEL: CPT

## 2024-07-08 RX ADMIN — CLOBAZAM 20 MG: 10 TABLET ORAL at 22:13

## 2024-07-08 RX ADMIN — ACETAMINOPHEN 650 MG: 325 TABLET ORAL at 16:13

## 2024-07-08 RX ADMIN — CLOBAZAM 20 MG: 10 TABLET ORAL at 14:50

## 2024-07-08 RX ADMIN — VALPROIC ACID 750 MG: 250 CAPSULE ORAL at 14:51

## 2024-07-08 RX ADMIN — ENOXAPARIN SODIUM 40 MG: 100 INJECTION SUBCUTANEOUS at 10:46

## 2024-07-08 RX ADMIN — SODIUM CHLORIDE, PRESERVATIVE FREE 10 ML: 5 INJECTION INTRAVENOUS at 22:13

## 2024-07-08 RX ADMIN — LACOSAMIDE 300 MG: 100 TABLET, FILM COATED ORAL at 14:50

## 2024-07-08 RX ADMIN — ONDANSETRON 4 MG: 4 TABLET, ORALLY DISINTEGRATING ORAL at 16:13

## 2024-07-08 RX ADMIN — ARIPIPRAZOLE 15 MG: 15 TABLET ORAL at 17:08

## 2024-07-08 RX ADMIN — LACOSAMIDE 300 MG: 100 TABLET, FILM COATED ORAL at 22:13

## 2024-07-08 RX ADMIN — RISPERIDONE 2 MG: 2 TABLET, FILM COATED ORAL at 22:13

## 2024-07-08 RX ADMIN — VALPROIC ACID 1000 MG: 250 CAPSULE, LIQUID FILLED ORAL at 10:46

## 2024-07-08 RX ADMIN — RISPERIDONE 1 MG: 2 TABLET, FILM COATED ORAL at 10:45

## 2024-07-08 RX ADMIN — SODIUM CHLORIDE, PRESERVATIVE FREE 10 ML: 5 INJECTION INTRAVENOUS at 10:46

## 2024-07-08 RX ADMIN — VALPROIC ACID 1000 MG: 250 CAPSULE, LIQUID FILLED ORAL at 22:13

## 2024-07-08 ASSESSMENT — ENCOUNTER SYMPTOMS
SHORTNESS OF BREATH: 0
VOMITING: 0
NAUSEA: 0
ALLERGIC/IMMUNOLOGIC NEGATIVE: 1
EYES NEGATIVE: 1
RESPIRATORY NEGATIVE: 1
ABDOMINAL PAIN: 0
GASTROINTESTINAL NEGATIVE: 1

## 2024-07-08 ASSESSMENT — PAIN SCALES - GENERAL
PAINLEVEL_OUTOF10: 10
PAINLEVEL_OUTOF10: 0

## 2024-07-08 ASSESSMENT — PAIN DESCRIPTION - DESCRIPTORS: DESCRIPTORS: ACHING

## 2024-07-08 ASSESSMENT — PAIN DESCRIPTION - ORIENTATION: ORIENTATION: MID

## 2024-07-08 ASSESSMENT — PAIN DESCRIPTION - LOCATION: LOCATION: STERNUM

## 2024-07-08 ASSESSMENT — PAIN SCALES - WONG BAKER: WONGBAKER_NUMERICALRESPONSE: NO HURT

## 2024-07-08 NOTE — CARE COORDINATION
Transitional Planning  Called [facility], [phone], group home able and willing to accept patient back upon dc.      345 pm Called legal guardian, mother, Wyatt [phone] [facility] left to return call.

## 2024-07-08 NOTE — CARE COORDINATION
Spoke with [facility] courts, [phone] who confirmed court date this morning at 9am in courtroom 4, case number [id number].  Informed court of patients hospitalization and inability to make scheduled court date.  Letter faxed to  to [phone] confirming hospitalization and unknown discharge date.  Message left for patients mother/guardian informing her of above and instructed her to call [facility] courts to check on rescheduled court date.

## 2024-07-08 NOTE — PLAN OF CARE
Problem: Discharge Planning  Goal: Discharge to home or other facility with appropriate resources  Outcome: Progressing  Flowsheets (Taken 7/8/2024 1624)  Discharge to home or other facility with appropriate resources: Identify barriers to discharge with patient and caregiver     Problem: Pain  Goal: Verbalizes/displays adequate comfort level or baseline comfort level  Outcome: Progressing  Flowsheets (Taken 7/8/2024 1624)  Verbalizes/displays adequate comfort level or baseline comfort level:   Encourage patient to monitor pain and request assistance   Assess pain using appropriate pain scale   Administer analgesics based on type and severity of pain and evaluate response     Problem: Safety - Adult  Goal: Free from fall injury  Outcome: Progressing  Flowsheets (Taken 7/8/2024 1624)  Free From Fall Injury: Instruct family/caregiver on patient safety

## 2024-07-09 VITALS
RESPIRATION RATE: 14 BRPM | SYSTOLIC BLOOD PRESSURE: 144 MMHG | OXYGEN SATURATION: 90 % | BODY MASS INDEX: 27.09 KG/M2 | HEART RATE: 98 BPM | DIASTOLIC BLOOD PRESSURE: 96 MMHG | WEIGHT: 200 LBS | TEMPERATURE: 96.8 F | HEIGHT: 72 IN

## 2024-07-09 LAB
ALBUMIN SERPL-MCNC: 4.3 G/DL (ref 3.5–5.2)
ALBUMIN/GLOB SERPL: 2 {RATIO} (ref 1–2.5)
ALP SERPL-CCNC: 66 U/L (ref 40–129)
ALT SERPL-CCNC: 17 U/L (ref 10–50)
ANION GAP SERPL CALCULATED.3IONS-SCNC: 18 MMOL/L (ref 9–16)
AST SERPL-CCNC: 33 U/L (ref 10–50)
BASOPHILS # BLD: 0.04 K/UL (ref 0–0.2)
BASOPHILS NFR BLD: 1 % (ref 0–2)
BILIRUB SERPL-MCNC: 0.5 MG/DL (ref 0–1.2)
BUN SERPL-MCNC: 20 MG/DL (ref 6–20)
CALCIUM SERPL-MCNC: 8.9 MG/DL (ref 8.6–10.4)
CHLORIDE SERPL-SCNC: 103 MMOL/L (ref 98–107)
CO2 SERPL-SCNC: 17 MMOL/L (ref 20–31)
CREAT SERPL-MCNC: 1 MG/DL (ref 0.7–1.2)
EOSINOPHIL # BLD: 0.2 K/UL (ref 0–0.44)
EOSINOPHILS RELATIVE PERCENT: 3 % (ref 1–4)
ERYTHROCYTE [DISTWIDTH] IN BLOOD BY AUTOMATED COUNT: 13.1 % (ref 11.8–14.4)
GFR, ESTIMATED: >90 ML/MIN/1.73M2
GLUCOSE SERPL-MCNC: 89 MG/DL (ref 74–99)
HCT VFR BLD AUTO: 46.9 % (ref 40.7–50.3)
HGB BLD-MCNC: 14.9 G/DL (ref 13–17)
IMM GRANULOCYTES # BLD AUTO: 0.05 K/UL (ref 0–0.3)
IMM GRANULOCYTES NFR BLD: 1 %
LYMPHOCYTES NFR BLD: 2.79 K/UL (ref 1.1–3.7)
LYMPHOCYTES RELATIVE PERCENT: 39 % (ref 24–43)
MCH RBC QN AUTO: 31.6 PG (ref 25.2–33.5)
MCHC RBC AUTO-ENTMCNC: 31.8 G/DL (ref 28.4–34.8)
MCV RBC AUTO: 99.6 FL (ref 82.6–102.9)
MONOCYTES NFR BLD: 0.6 K/UL (ref 0.1–1.2)
MONOCYTES NFR BLD: 9 % (ref 3–12)
NEUTROPHILS NFR BLD: 47 % (ref 36–65)
NEUTS SEG NFR BLD: 3.42 K/UL (ref 1.5–8.1)
NRBC BLD-RTO: 0 PER 100 WBC
PLATELET # BLD AUTO: 146 K/UL (ref 138–453)
PMV BLD AUTO: 9.9 FL (ref 8.1–13.5)
POTASSIUM SERPL-SCNC: 4.4 MMOL/L (ref 3.7–5.3)
PROT SERPL-MCNC: 7 G/DL (ref 6.6–8.7)
RBC # BLD AUTO: 4.71 M/UL (ref 4.21–5.77)
SODIUM SERPL-SCNC: 138 MMOL/L (ref 136–145)
WBC OTHER # BLD: 7.1 K/UL (ref 3.5–11.3)

## 2024-07-09 PROCEDURE — 2580000003 HC RX 258

## 2024-07-09 PROCEDURE — 6370000000 HC RX 637 (ALT 250 FOR IP)

## 2024-07-09 PROCEDURE — 99239 HOSP IP/OBS DSCHRG MGMT >30: CPT | Performed by: PSYCHIATRY & NEUROLOGY

## 2024-07-09 PROCEDURE — 94761 N-INVAS EAR/PLS OXIMETRY MLT: CPT

## 2024-07-09 PROCEDURE — 36415 COLL VENOUS BLD VENIPUNCTURE: CPT

## 2024-07-09 PROCEDURE — 80053 COMPREHEN METABOLIC PANEL: CPT

## 2024-07-09 PROCEDURE — 6360000002 HC RX W HCPCS

## 2024-07-09 PROCEDURE — 97165 OT EVAL LOW COMPLEX 30 MIN: CPT

## 2024-07-09 PROCEDURE — 97535 SELF CARE MNGMENT TRAINING: CPT

## 2024-07-09 PROCEDURE — 85025 COMPLETE CBC W/AUTO DIFF WBC: CPT

## 2024-07-09 PROCEDURE — 51701 INSERT BLADDER CATHETER: CPT

## 2024-07-09 PROCEDURE — 51798 US URINE CAPACITY MEASURE: CPT

## 2024-07-09 RX ORDER — 0.9 % SODIUM CHLORIDE 0.9 %
500 INTRAVENOUS SOLUTION INTRAVENOUS ONCE
Status: COMPLETED | OUTPATIENT
Start: 2024-07-09 | End: 2024-07-09

## 2024-07-09 RX ORDER — VALPROIC ACID 250 MG/1
750 CAPSULE, LIQUID FILLED ORAL DAILY
Qty: 120 CAPSULE | Refills: 3 | Status: SHIPPED | OUTPATIENT
Start: 2024-07-09

## 2024-07-09 RX ORDER — LACOSAMIDE 150 MG/1
300 TABLET ORAL 2 TIMES DAILY
Qty: 60 TABLET | Refills: 3 | Status: SHIPPED | OUTPATIENT
Start: 2024-07-09 | End: 2024-09-07

## 2024-07-09 RX ORDER — VALPROIC ACID 250 MG/1
1000 CAPSULE, LIQUID FILLED ORAL 2 TIMES DAILY
Qty: 120 CAPSULE | Refills: 3 | Status: SHIPPED | OUTPATIENT
Start: 2024-07-09

## 2024-07-09 RX ADMIN — VALPROIC ACID 1000 MG: 250 CAPSULE, LIQUID FILLED ORAL at 10:18

## 2024-07-09 RX ADMIN — SODIUM CHLORIDE 500 ML: 9 INJECTION, SOLUTION INTRAVENOUS at 13:07

## 2024-07-09 RX ADMIN — ENOXAPARIN SODIUM 40 MG: 100 INJECTION SUBCUTANEOUS at 10:17

## 2024-07-09 RX ADMIN — VALPROIC ACID 750 MG: 250 CAPSULE ORAL at 14:44

## 2024-07-09 RX ADMIN — RISPERIDONE 1 MG: 2 TABLET, FILM COATED ORAL at 10:17

## 2024-07-09 RX ADMIN — SODIUM CHLORIDE, PRESERVATIVE FREE 10 ML: 5 INJECTION INTRAVENOUS at 10:19

## 2024-07-09 RX ADMIN — CLOBAZAM 20 MG: 10 TABLET ORAL at 10:17

## 2024-07-09 RX ADMIN — LACOSAMIDE 300 MG: 100 TABLET, FILM COATED ORAL at 10:17

## 2024-07-09 ASSESSMENT — ENCOUNTER SYMPTOMS
GASTROINTESTINAL NEGATIVE: 1
ALLERGIC/IMMUNOLOGIC NEGATIVE: 1
EYES NEGATIVE: 1
RESPIRATORY NEGATIVE: 1

## 2024-07-09 NOTE — DISCHARGE SUMMARY
bathing or swimming unsupervised  Avoid locking doors, it prevents someone from helping you if needed  Avoid stairs unsupervised  Avoid cooking unsupervised  Follow up with your primary care physician, in 7 days regarding urine retention  Follow up with neurologist in 1 month   Take all your medication as prescribed . If your prescription has refills, obtain the medication refills from the pharmacy before you run out. Seek medical attention if you run out of a medication you need and do not have any refills of.   Depakote: 1000 mg at 9:00 a.m., 750 mg at 3:00 p.m., 1000 mg at 9:00 p.m..    Vimpat:  300 mg twice a day.    Onfi:  20 mg twice a day:   Reasons to call your doctor or go to the ER: Seizure, numbness, weakness, or any other concerning symptoms.      Discharge Medications:  Current Discharge Medication List        START taking these medications    Details   !! valproic acid (DEPAKENE) 250 MG capsule Take 4 capsules by mouth 2 times daily  Qty: 120 capsule, Refills: 3      !! valproic acid (DEPAKENE) 250 MG capsule Take 3 capsules by mouth daily  Qty: 120 capsule, Refills: 3       !! - Potential duplicate medications found. Please discuss with provider.        CONTINUE these medications which have CHANGED    Details   lacosamide (VIMPAT) 150 MG TABS tablet Take 2 tablets by mouth 2 times daily for 60 days. Max Daily Amount: 600 mg  Qty: 60 tablet, Refills: 3    Comments: Vimpat:  300 mg twice a day.  Associated Diagnoses: Breakthrough seizure (HCC)           CONTINUE these medications which have NOT CHANGED    Details   folic acid (FOLVITE) 1 MG tablet TAKE 1 TABLET BY MOUTH DAILY  Qty: 28 tablet, Refills: 5    Comments: Maximum Refills Reached  Associated Diagnoses: Folic acid deficiency      risperiDONE (RISPERDAL) 1 MG tablet TAKE 1 TABLET BY MOUTH EVERY MORNING AND 2 TABLETS NIGHTLY.  Qty: 90 tablet, Refills: 2    Comments: Authorized Quantity Exceeded  Associated Diagnoses: Active autistic disorder

## 2024-07-09 NOTE — DISCHARGE INSTRUCTIONS
No driving for at least 6 months.  No heavy lifting for at least 6 months  No bathing or swimming unsupervised  Avoid locking doors, it prevents someone from helping you if needed  Avoid stairs unsupervised  Avoid cooking unsupervised  Follow up with your primary care physician, in 7 days  Follow up with neurologist in 1 month   Take all your medication as prescribed . If your prescription has refills, obtain the medication refills from the pharmacy before you run out. Seek medical attention if you run out of a medication you need and do not have any refills of.   Depakote: 1000 mg at 9:00 a.m., 750 mg at 3:00 p.m., 1000 mg at 9:00 p.m..    Vimpat:  300 mg twice a day.    Onfi:  20 mg twice a day:   Reasons to call your doctor or go to the ER: Seizure, numbness, weakness, or any other concerning symptoms.

## 2024-07-09 NOTE — PLAN OF CARE
Problem: Discharge Planning  Goal: Discharge to home or other facility with appropriate resources  7/9/2024 1453 by Arlene Reddy RN  Outcome: Adequate for Discharge  Flowsheets (Taken 7/8/2024 2000 by Carlie Gutierrez RN)  Discharge to home or other facility with appropriate resources: Identify barriers to discharge with patient and caregiver  7/9/2024 0505 by Carlie Gutierrez RN  Outcome: Progressing  Flowsheets (Taken 7/8/2024 2000)  Discharge to home or other facility with appropriate resources: Identify barriers to discharge with patient and caregiver     Problem: Pain  Goal: Verbalizes/displays adequate comfort level or baseline comfort level  7/9/2024 1453 by Arlene Reddy RN  Outcome: Adequate for Discharge  Flowsheets (Taken 7/8/2024 1624)  Verbalizes/displays adequate comfort level or baseline comfort level:   Encourage patient to monitor pain and request assistance   Assess pain using appropriate pain scale   Administer analgesics based on type and severity of pain and evaluate response  7/9/2024 0505 by Carlie Gutierrez RN  Outcome: Progressing     Problem: Safety - Adult  Goal: Free from fall injury  7/9/2024 1453 by Arlene Reddy RN  Outcome: Adequate for Discharge  Flowsheets (Taken 7/8/2024 2000 by Carlie Gutierrez RN)  Free From Fall Injury: Instruct family/caregiver on patient safety  7/9/2024 0505 by Carile Gutierrez RN  Outcome: Progressing  Flowsheets (Taken 7/8/2024 2000)  Free From Fall Injury: Instruct family/caregiver on patient safety

## 2024-07-09 NOTE — PLAN OF CARE
Problem: Discharge Planning  Goal: Discharge to home or other facility with appropriate resources  7/9/2024 0505 by Carlie Gutierrez RN  Outcome: Progressing  Flowsheets (Taken 7/8/2024 2000)  Discharge to home or other facility with appropriate resources: Identify barriers to discharge with patient and caregiver     Problem: Pain  Goal: Verbalizes/displays adequate comfort level or baseline comfort level  7/9/2024 0505 by Carlie Gutierrez RN  Outcome: Progressing     Problem: Safety - Adult  Goal: Free from fall injury  7/9/2024 0505 by Carlie Gutierrez RN  Outcome: Progressing  Flowsheets (Taken 7/8/2024 2000)  Free From Fall Injury: Instruct family/caregiver on patient safety
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
## 2024-07-09 NOTE — CARE COORDINATION
Case Management Assessment  Initial Evaluation    Date/Time of Evaluation: 7/9/2024 2:40 PM  Assessment Completed by: Macrina Davenport RN    If patient is discharged prior to next notation, then this note serves as note for discharge by case management.    Patient Name: Jerrell Bell                   YOB: 2000  Diagnosis: Breakthrough seizure (HCC) [G40.919]                   Date / Time: 7/7/2024  4:09 AM    Patient Admission Status: Inpatient   Readmission Risk (Low < 19, Mod (19-27), High > 27): Readmission Risk Score: 9.5    Current PCP: No primary care provider on file.  PCP verified by CM? (P) No    Chart Reviewed: Yes      History Provided by: (P) Child/Family  Patient Orientation: (P) Alert and Oriented    Patient Cognition: (P) Alert    Hospitalization in the last 30 days (Readmission):  No    If yes, Readmission Assessment in CM Navigator will be completed.    Advance Directives:      Code Status: Full Code   Patient's Primary Decision Maker is: (P) Named in Scanned ACP Document      Discharge Planning:    Patient lives with: (P) Other (Comment) (group home) Type of Home: (P) Group Home  Primary Care Giver: (P) Other (Comment) (group home)  Patient Support Systems include: (P) Parent, Family Members   Current Financial resources: (P) Medicaid  Current community resources: (P) Housing  Current services prior to admission: (P) None            Current DME:              Type of Home Care services:  (P) None    ADLS  Prior functional level: (P) Independent in ADLs/IADLs  Current functional level: (P) Assistance with the following:, Toileting, Mobility    PT AM-PAC: 22 /24  OT AM-PAC: 21 /24    Family can provide assistance at DC: (P) No  Would you like Case Management to discuss the discharge plan with any other family members/significant others, and if so, who? (P) No  Plans to Return to Present Housing: (P) Yes  Other Identified Issues/Barriers to RETURNING to current housing: current

## 2024-08-22 ENCOUNTER — OFFICE VISIT (OUTPATIENT)
Dept: FAMILY MEDICINE CLINIC | Age: 24
End: 2024-08-22

## 2024-08-22 ENCOUNTER — HOSPITAL ENCOUNTER (OUTPATIENT)
Age: 24
Setting detail: SPECIMEN
Discharge: HOME | End: 2024-08-22

## 2024-08-22 VITALS
DIASTOLIC BLOOD PRESSURE: 78 MMHG | TEMPERATURE: 97.6 F | SYSTOLIC BLOOD PRESSURE: 120 MMHG | WEIGHT: 203 LBS | BODY MASS INDEX: 27.53 KG/M2 | HEART RATE: 71 BPM | OXYGEN SATURATION: 98 %

## 2024-08-22 DIAGNOSIS — L02.92 BOIL: Primary | ICD-10-CM

## 2024-08-22 DIAGNOSIS — L02.92 BOIL: ICD-10-CM

## 2024-08-22 RX ORDER — LORAZEPAM 1 MG/1
TABLET ORAL
COMMUNITY
Start: 2023-07-18

## 2024-08-22 RX ORDER — DIVALPROEX SODIUM 125 MG/1
6 CAPSULE, COATED PELLETS ORAL EVERY 8 HOURS
COMMUNITY
Start: 2023-12-11

## 2024-08-22 RX ORDER — CEPHALEXIN 500 MG/1
500 CAPSULE ORAL 4 TIMES DAILY
Qty: 40 CAPSULE | Refills: 0 | Status: SHIPPED | OUTPATIENT
Start: 2024-08-22 | End: 2024-09-01

## 2024-08-22 RX ORDER — LANOLIN ALCOHOL/MO/W.PET/CERES
CREAM (GRAM) TOPICAL
COMMUNITY
Start: 2024-06-21

## 2024-08-22 ASSESSMENT — ENCOUNTER SYMPTOMS
VOMITING: 0
NAUSEA: 0

## 2024-08-22 NOTE — PROGRESS NOTES
[facility] WALK-IN FAMILY MEDICINE  [address]  [address]  [facility] [address]  Dept: [phone]  Dept Fax: [phone]    Jerrell Bell is a 24 y.o. male who presents to the urgent care today for his medical conditions/complaints as notedbelow.  Jerrell Bell is c/o of Mass (Lump or bump on rt side of face near eye three weeks )      HPI:     24 yr old male presents for bump on rt side of face for 3 weeks  Caregiver reports yellowish pus and enlargement last cpl days  Hx seizures and autism, lives in group home  No hx MRSA or skin infections    Mass  This is a new problem. The current episode started 1 to 4 weeks ago (3 weeks). The problem occurs constantly. The problem has been gradually worsening. Pertinent negatives include no anorexia, fever, nausea or vomiting. Nothing aggravates the symptoms. He has tried nothing for the symptoms. The treatment provided no relief.       Past Medical History:   Diagnosis Date    Autism     COVID-19 12/13/2021    Depression     mood swings/bipolar    Epilepsy (HCC)         Current Outpatient Medications   Medication Sig Dispense Refill    vitamin B-12 (CYANOCOBALAMIN) 1000 MCG tablet       divalproex (DEPAKOTE SPRINKLE) 125 MG DR capsule Take 6 capsules by mouth in the morning and 6 capsules at noon and 6 capsules in the evening.      magnesium oxide (MAG-OX) 400 (240 Mg) MG tablet       LORazepam (ATIVAN) 1 MG tablet Take by mouth.      perampanel (FYCOMPA) 2 MG TABS tablet Take 1 tablet by mouth daily.      cephALEXin (KEFLEX) 500 MG capsule Take 1 capsule by mouth 4 times daily for 10 days 40 capsule 0    mupirocin (BACTROBAN) 2 % ointment Apply topically 3 times daily. 1 each 0    lacosamide (VIMPAT) 150 MG TABS tablet Take 2 tablets by mouth 2 times daily for 60 days. Max Daily Amount: 600 mg 60 tablet 3    valproic acid (DEPAKENE) 250 MG capsule Take 4 capsules by mouth 2

## 2024-08-24 LAB
MICROORGANISM SPEC CULT: NORMAL
MICROORGANISM/AGENT SPEC: NORMAL
MICROORGANISM/AGENT SPEC: NORMAL
SPECIMEN DESCRIPTION: NORMAL

## 2024-08-28 RX ORDER — CEPHALEXIN 500 MG/1
CAPSULE ORAL
Qty: 40 CAPSULE | Refills: 2 | OUTPATIENT
Start: 2024-08-28

## 2024-11-01 ENCOUNTER — HOSPITAL ENCOUNTER (INPATIENT)
Age: 24
LOS: 1 days | Discharge: HOME HEALTH | DRG: 053 | End: 2024-11-02
Attending: EMERGENCY MEDICINE | Admitting: PSYCHIATRY & NEUROLOGY
Payer: MEDICAID

## 2024-11-01 ENCOUNTER — APPOINTMENT (OUTPATIENT)
Dept: CT IMAGING | Age: 24
DRG: 053 | End: 2024-11-01
Payer: MEDICAID

## 2024-11-01 ENCOUNTER — APPOINTMENT (OUTPATIENT)
Dept: GENERAL RADIOLOGY | Age: 24
DRG: 053 | End: 2024-11-01
Payer: MEDICAID

## 2024-11-01 DIAGNOSIS — G40.814 LENNOX-GASTAUT SYNDROME, INTRACTABLE, WITHOUT STATUS EPILEPTICUS (HCC): Primary | ICD-10-CM

## 2024-11-01 DIAGNOSIS — G40.909 SEIZURE DISORDER (HCC): ICD-10-CM

## 2024-11-01 PROBLEM — G40.813: Status: ACTIVE | Noted: 2024-11-01

## 2024-11-01 LAB
ALBUMIN SERPL-MCNC: 4.6 G/DL (ref 3.5–5.2)
ALBUMIN/GLOB SERPL: 1.5 {RATIO} (ref 1–2.5)
ALP SERPL-CCNC: 66 U/L (ref 40–129)
ALT SERPL-CCNC: 21 U/L (ref 10–50)
ANION GAP SERPL CALCULATED.3IONS-SCNC: 19 MMOL/L (ref 9–16)
APAP SERPL-MCNC: <5 UG/ML (ref 10–30)
AST SERPL-CCNC: 35 U/L (ref 10–50)
BASOPHILS # BLD: 0.03 K/UL (ref 0–0.2)
BASOPHILS NFR BLD: 1 % (ref 0–2)
BILIRUB SERPL-MCNC: 0.5 MG/DL (ref 0–1.2)
BUN SERPL-MCNC: 19 MG/DL (ref 6–20)
CALCIUM SERPL-MCNC: 9.5 MG/DL (ref 8.6–10.4)
CHLORIDE SERPL-SCNC: 102 MMOL/L (ref 98–107)
CO2 SERPL-SCNC: 21 MMOL/L (ref 20–31)
CREAT SERPL-MCNC: 1.1 MG/DL (ref 0.7–1.2)
EOSINOPHIL # BLD: 0.58 K/UL (ref 0–0.44)
EOSINOPHILS RELATIVE PERCENT: 9 % (ref 1–4)
ERYTHROCYTE [DISTWIDTH] IN BLOOD BY AUTOMATED COUNT: 12.5 % (ref 11.8–14.4)
EST. AVERAGE GLUCOSE BLD GHB EST-MCNC: 68 MG/DL
ETHANOL PERCENT: <0.01 %
ETHANOLAMINE SERPL-MCNC: <10 MG/DL (ref 0–0.08)
GFR, ESTIMATED: >90 ML/MIN/1.73M2
GLUCOSE SERPL-MCNC: 88 MG/DL (ref 74–99)
HBA1C MFR BLD: 4 % (ref 4–6)
HCT VFR BLD AUTO: 44.6 % (ref 40.7–50.3)
HGB BLD-MCNC: 15.1 G/DL (ref 13–17)
IMM GRANULOCYTES # BLD AUTO: 0.04 K/UL (ref 0–0.3)
IMM GRANULOCYTES NFR BLD: 1 %
LYMPHOCYTES NFR BLD: 1.59 K/UL (ref 1.1–3.7)
LYMPHOCYTES RELATIVE PERCENT: 25 % (ref 24–43)
MCH RBC QN AUTO: 32 PG (ref 25.2–33.5)
MCHC RBC AUTO-ENTMCNC: 33.9 G/DL (ref 28.4–34.8)
MCV RBC AUTO: 94.5 FL (ref 82.6–102.9)
MONOCYTES NFR BLD: 0.39 K/UL (ref 0.1–1.2)
MONOCYTES NFR BLD: 6 % (ref 3–12)
NEUTROPHILS NFR BLD: 58 % (ref 36–65)
NEUTS SEG NFR BLD: 3.62 K/UL (ref 1.5–8.1)
NRBC BLD-RTO: 0.3 PER 100 WBC
PLATELET # BLD AUTO: 147 K/UL (ref 138–453)
PMV BLD AUTO: 9.4 FL (ref 8.1–13.5)
POTASSIUM SERPL-SCNC: 4 MMOL/L (ref 3.7–5.3)
PROT SERPL-MCNC: 7.6 G/DL (ref 6.6–8.7)
RBC # BLD AUTO: 4.72 M/UL (ref 4.21–5.77)
SALICYLATES SERPL-MCNC: <0.5 MG/DL (ref 0–10)
SODIUM SERPL-SCNC: 142 MMOL/L (ref 136–145)
WBC OTHER # BLD: 6.3 K/UL (ref 3.5–11.3)

## 2024-11-01 PROCEDURE — 93005 ELECTROCARDIOGRAM TRACING: CPT

## 2024-11-01 PROCEDURE — 96372 THER/PROPH/DIAG INJ SC/IM: CPT

## 2024-11-01 PROCEDURE — 2580000003 HC RX 258

## 2024-11-01 PROCEDURE — 6360000002 HC RX W HCPCS

## 2024-11-01 PROCEDURE — 95700 EEG CONT REC W/VID EEG TECH: CPT

## 2024-11-01 PROCEDURE — 83036 HEMOGLOBIN GLYCOSYLATED A1C: CPT

## 2024-11-01 PROCEDURE — 80164 ASSAY DIPROPYLACETIC ACD TOT: CPT

## 2024-11-01 PROCEDURE — 36415 COLL VENOUS BLD VENIPUNCTURE: CPT

## 2024-11-01 PROCEDURE — 80179 DRUG ASSAY SALICYLATE: CPT

## 2024-11-01 PROCEDURE — 80235 DRUG ASSAY LACOSAMIDE: CPT

## 2024-11-01 PROCEDURE — 2060000000 HC ICU INTERMEDIATE R&B

## 2024-11-01 PROCEDURE — 80053 COMPREHEN METABOLIC PANEL: CPT

## 2024-11-01 PROCEDURE — 95813 EEG EXTND MNTR 61-119 MIN: CPT

## 2024-11-01 PROCEDURE — 85025 COMPLETE CBC W/AUTO DIFF WBC: CPT

## 2024-11-01 PROCEDURE — 95711 VEEG 2-12 HR UNMONITORED: CPT

## 2024-11-01 PROCEDURE — 80143 DRUG ASSAY ACETAMINOPHEN: CPT

## 2024-11-01 PROCEDURE — 99285 EMERGENCY DEPT VISIT HI MDM: CPT

## 2024-11-01 PROCEDURE — 6370000000 HC RX 637 (ALT 250 FOR IP)

## 2024-11-01 PROCEDURE — 99223 1ST HOSP IP/OBS HIGH 75: CPT | Performed by: PSYCHIATRY & NEUROLOGY

## 2024-11-01 PROCEDURE — 70450 CT HEAD/BRAIN W/O DYE: CPT

## 2024-11-01 PROCEDURE — 80165 DIPROPYLACETIC ACID FREE: CPT

## 2024-11-01 PROCEDURE — G0480 DRUG TEST DEF 1-7 CLASSES: HCPCS

## 2024-11-01 PROCEDURE — 71045 X-RAY EXAM CHEST 1 VIEW: CPT

## 2024-11-01 RX ORDER — POTASSIUM CHLORIDE 7.45 MG/ML
10 INJECTION INTRAVENOUS PRN
Status: DISCONTINUED | OUTPATIENT
Start: 2024-11-01 | End: 2024-11-02 | Stop reason: HOSPADM

## 2024-11-01 RX ORDER — ARIPIPRAZOLE 15 MG/1
15 TABLET ORAL DAILY
Status: DISCONTINUED | OUTPATIENT
Start: 2024-11-01 | End: 2024-11-02 | Stop reason: HOSPADM

## 2024-11-01 RX ORDER — ONDANSETRON 4 MG/1
4 TABLET, ORALLY DISINTEGRATING ORAL EVERY 8 HOURS PRN
Status: DISCONTINUED | OUTPATIENT
Start: 2024-11-01 | End: 2024-11-02 | Stop reason: HOSPADM

## 2024-11-01 RX ORDER — ONDANSETRON 2 MG/ML
4 INJECTION INTRAMUSCULAR; INTRAVENOUS EVERY 6 HOURS PRN
Status: DISCONTINUED | OUTPATIENT
Start: 2024-11-01 | End: 2024-11-02 | Stop reason: HOSPADM

## 2024-11-01 RX ORDER — LORAZEPAM 2 MG/ML
4 INJECTION INTRAMUSCULAR EVERY 5 MIN PRN
Status: DISCONTINUED | OUTPATIENT
Start: 2024-11-01 | End: 2024-11-01

## 2024-11-01 RX ORDER — POLYETHYLENE GLYCOL 3350 17 G/17G
17 POWDER, FOR SOLUTION ORAL DAILY PRN
Status: DISCONTINUED | OUTPATIENT
Start: 2024-11-01 | End: 2024-11-02 | Stop reason: HOSPADM

## 2024-11-01 RX ORDER — ACETAMINOPHEN 325 MG/1
650 TABLET ORAL EVERY 6 HOURS PRN
Status: DISCONTINUED | OUTPATIENT
Start: 2024-11-01 | End: 2024-11-02 | Stop reason: HOSPADM

## 2024-11-01 RX ORDER — ACETAMINOPHEN 650 MG/1
650 SUPPOSITORY RECTAL EVERY 6 HOURS PRN
Status: DISCONTINUED | OUTPATIENT
Start: 2024-11-01 | End: 2024-11-02 | Stop reason: HOSPADM

## 2024-11-01 RX ORDER — CLOBAZAM 10 MG/1
20 TABLET ORAL 2 TIMES DAILY
Status: DISCONTINUED | OUTPATIENT
Start: 2024-11-01 | End: 2024-11-02 | Stop reason: HOSPADM

## 2024-11-01 RX ORDER — SODIUM CHLORIDE 0.9 % (FLUSH) 0.9 %
5-40 SYRINGE (ML) INJECTION PRN
Status: DISCONTINUED | OUTPATIENT
Start: 2024-11-01 | End: 2024-11-02 | Stop reason: HOSPADM

## 2024-11-01 RX ORDER — ENOXAPARIN SODIUM 100 MG/ML
40 INJECTION SUBCUTANEOUS DAILY
Status: DISCONTINUED | OUTPATIENT
Start: 2024-11-01 | End: 2024-11-02 | Stop reason: HOSPADM

## 2024-11-01 RX ORDER — MIDAZOLAM HYDROCHLORIDE 5 MG/ML
INJECTION INTRAMUSCULAR; INTRAVENOUS
Status: DISPENSED
Start: 2024-11-01 | End: 2024-11-01

## 2024-11-01 RX ORDER — MIDAZOLAM HYDROCHLORIDE 1 MG/ML
INJECTION, SOLUTION INTRAMUSCULAR; INTRAVENOUS
Status: COMPLETED
Start: 2024-11-01 | End: 2024-11-01

## 2024-11-01 RX ORDER — MAGNESIUM SULFATE IN WATER 40 MG/ML
2000 INJECTION, SOLUTION INTRAVENOUS PRN
Status: DISCONTINUED | OUTPATIENT
Start: 2024-11-01 | End: 2024-11-02 | Stop reason: HOSPADM

## 2024-11-01 RX ORDER — GABAPENTIN 100 MG/1
100 CAPSULE ORAL 3 TIMES DAILY
Status: DISCONTINUED | OUTPATIENT
Start: 2024-11-01 | End: 2024-11-02 | Stop reason: HOSPADM

## 2024-11-01 RX ORDER — DIVALPROEX SODIUM 500 MG/1
1500 TABLET, DELAYED RELEASE ORAL 2 TIMES DAILY
Status: DISCONTINUED | OUTPATIENT
Start: 2024-11-01 | End: 2024-11-02 | Stop reason: HOSPADM

## 2024-11-01 RX ORDER — FOLIC ACID 1 MG/1
1000 TABLET ORAL DAILY
Status: DISCONTINUED | OUTPATIENT
Start: 2024-11-01 | End: 2024-11-02 | Stop reason: HOSPADM

## 2024-11-01 RX ORDER — MIDAZOLAM HYDROCHLORIDE 2 MG/2ML
2 INJECTION, SOLUTION INTRAMUSCULAR; INTRAVENOUS ONCE
Status: COMPLETED | OUTPATIENT
Start: 2024-11-01 | End: 2024-11-01

## 2024-11-01 RX ORDER — ESCITALOPRAM OXALATE 5 MG/1
5 TABLET ORAL DAILY
COMMUNITY
Start: 2024-10-30

## 2024-11-01 RX ORDER — LACOSAMIDE 100 MG/1
300 TABLET ORAL 2 TIMES DAILY
Status: DISCONTINUED | OUTPATIENT
Start: 2024-11-01 | End: 2024-11-02 | Stop reason: HOSPADM

## 2024-11-01 RX ORDER — RISPERIDONE 0.5 MG/1
2 TABLET ORAL NIGHTLY
Status: DISCONTINUED | OUTPATIENT
Start: 2024-11-01 | End: 2024-11-02 | Stop reason: HOSPADM

## 2024-11-01 RX ORDER — SODIUM CHLORIDE 0.9 % (FLUSH) 0.9 %
5-40 SYRINGE (ML) INJECTION EVERY 12 HOURS SCHEDULED
Status: DISCONTINUED | OUTPATIENT
Start: 2024-11-01 | End: 2024-11-02 | Stop reason: HOSPADM

## 2024-11-01 RX ORDER — POTASSIUM CHLORIDE 1500 MG/1
40 TABLET, EXTENDED RELEASE ORAL PRN
Status: DISCONTINUED | OUTPATIENT
Start: 2024-11-01 | End: 2024-11-02 | Stop reason: HOSPADM

## 2024-11-01 RX ORDER — RISPERIDONE 0.5 MG/1
1 TABLET ORAL DAILY
Status: DISCONTINUED | OUTPATIENT
Start: 2024-11-01 | End: 2024-11-02 | Stop reason: HOSPADM

## 2024-11-01 RX ORDER — SODIUM CHLORIDE 9 MG/ML
INJECTION, SOLUTION INTRAVENOUS PRN
Status: DISCONTINUED | OUTPATIENT
Start: 2024-11-01 | End: 2024-11-02 | Stop reason: HOSPADM

## 2024-11-01 RX ORDER — LORAZEPAM 2 MG/ML
2 INJECTION INTRAMUSCULAR EVERY 5 MIN PRN
Status: DISCONTINUED | OUTPATIENT
Start: 2024-11-01 | End: 2024-11-02 | Stop reason: HOSPADM

## 2024-11-01 RX ADMIN — LACOSAMIDE 300 MG: 100 TABLET, FILM COATED ORAL at 14:57

## 2024-11-01 RX ADMIN — MIDAZOLAM HYDROCHLORIDE 2 MG: 2 INJECTION, SOLUTION INTRAMUSCULAR; INTRAVENOUS at 09:37

## 2024-11-01 RX ADMIN — ENOXAPARIN SODIUM 40 MG: 100 INJECTION SUBCUTANEOUS at 14:57

## 2024-11-01 RX ADMIN — LACOSAMIDE 300 MG: 100 TABLET, FILM COATED ORAL at 20:39

## 2024-11-01 RX ADMIN — CLOBAZAM 20 MG: 10 TABLET ORAL at 20:39

## 2024-11-01 RX ADMIN — GABAPENTIN 100 MG: 100 CAPSULE ORAL at 21:29

## 2024-11-01 RX ADMIN — DIVALPROEX SODIUM 1500 MG: 500 TABLET, DELAYED RELEASE ORAL at 20:40

## 2024-11-01 RX ADMIN — CLOBAZAM 20 MG: 10 TABLET ORAL at 14:57

## 2024-11-01 RX ADMIN — MIDAZOLAM 2 MG: 1 INJECTION INTRAMUSCULAR; INTRAVENOUS at 09:37

## 2024-11-01 RX ADMIN — DIVALPROEX SODIUM 1500 MG: 500 TABLET, DELAYED RELEASE ORAL at 15:00

## 2024-11-01 RX ADMIN — FOLIC ACID 1000 MCG: 1 TABLET ORAL at 14:57

## 2024-11-01 RX ADMIN — PERAMPANEL 4 MG: 2 TABLET ORAL at 14:57

## 2024-11-01 RX ADMIN — RISPERIDONE 2 MG: 0.5 TABLET, FILM COATED ORAL at 20:40

## 2024-11-01 RX ADMIN — ARIPIPRAZOLE 15 MG: 15 TABLET ORAL at 18:52

## 2024-11-01 RX ADMIN — SODIUM CHLORIDE, PRESERVATIVE FREE 10 ML: 5 INJECTION INTRAVENOUS at 20:44

## 2024-11-01 RX ADMIN — RISPERIDONE 1 MG: 0.5 TABLET, FILM COATED ORAL at 15:00

## 2024-11-01 ASSESSMENT — PAIN SCALES - GENERAL
PAINLEVEL_OUTOF10: 4
PAINLEVEL_OUTOF10: 0

## 2024-11-01 ASSESSMENT — PAIN DESCRIPTION - DESCRIPTORS: DESCRIPTORS: ACHING

## 2024-11-01 ASSESSMENT — PAIN DESCRIPTION - ORIENTATION: ORIENTATION: RIGHT;LEFT

## 2024-11-01 ASSESSMENT — PAIN DESCRIPTION - LOCATION: LOCATION: LEG;FOOT

## 2024-11-01 NOTE — ED NOTES
The following labs were labeled with appropriate pt sticker and tubed to lab:     [] Blue     [x] Lavender   [] on ice  [x] Green/yellow  [x] Green/black [] on ice  [] Dunn  [] on ice  [x] Yellow  [x] Red  [] Pink  [] Type/ Screen  [] ABG  [] VBG    [] COVID-19 swab    [] Rapid  [] PCR  [] Flu swab  [] Peds Viral Panel     [] Urine Sample  [] Fecal Sample  [] Pelvic Cultures  [] Blood Cultures  [] X 2  [] STREP Cultures  [] Wound Cultures

## 2024-11-01 NOTE — H&P
[facility] NEUROLOGY & NEUROSCIENCE  IN-PATIENT SERVICE    HISTORY & PHYSICAL EXAMINATION NOTE      Date:   11/1/2024  Patient name:  Jerrell Bell  Date of admission:  11/1/2024  9:29 AM  MRN:   [id number]  Account:  [id number]  YOB: 2000  PCP:    No primary care provider on file.  Room:   [address] [place] [address]  Code Status:    Full Code    Chief Complaint:     Chief Complaint   Patient presents with    Seizures       History Obtained From:     non-family caregiver     History of Present Illness:     The patient is a right handed, 24 y.o. male with past medical hx of LGS with behavioral comorbidities and autism presented with multiple breakthrough seizures.  Patient currently on Depakote 1500 mg twice daily, Fycompa 4 mg daily, Vimpat 150 mg twice daily and Onfi 20 mg twice daily.  Patient typically has 1 clinical generalized tonic-clonic seizure per week.  Today he had 2 brief generalized seizures lasting 1 to 2 minutes followed by a long 20+ minute seizure.  He was taken to the emergency department.  Received 5 mg IN midazolam en route.    Patient recently had adjustments in both benztropine and Risperdal whereby both doses were reduced.  Last antiseizure medication adjustment was in September 2024 where Fycompa was increased from 2 mg to 4 mg.  On evaluation, patient is not at baseline but improving.  He is able to verbalize intermittently and short answers, following commands.  Caregiver says he usually is able to converse but typically does not speak much after his seizures.  He was connected to [place].    Does have significant tremors which are likely drug-induced as well as mild to moderate generalized increased tone.    Of note patient recently was assigned a new nurse who is still figuring out patient's habits and time.  Caregiver is concerned possibly some degree of inconsistently with patient obtaining his medication.    Most recent EEG 7/7/2024 shows generalized frontally

## 2024-11-01 NOTE — ED PROVIDER NOTES
[facility]     Emergency Department     Faculty Attestation    I performed a history and physical examination of the patient and discussed management with the resident. I reviewed the resident’s note and agree with the documented findings and plan of care. Any areas of disagreement are noted on the chart. I was personally present for the key portions of any procedures. I have documented in the chart those procedures where I was not present during the key portions. I have reviewed the emergency nurses triage note. I agree with the chief complaint, past medical history, past surgical history, allergies, medications, social and family history as documented unless otherwise noted below. Documentation of the HPI, Physical Exam and Medical Decision Making performed by medical students or scribes is based on my personal performance of the HPI, PE and MDM. For Physician Assistant/ Nurse Practitioner cases/documentation I have personally evaluated this patient and have completed at least one if not all key elements of the E/M (history, physical exam, and MDM). Additional findings are as noted.    Vital signs:   Vitals:    11/01/24 1003   BP: 133/80   Pulse: 94   Resp: 12   Temp:    SpO2: 93%      Patient presents from a group home having a seizure. Per EMS he is \"between meds\". Received 5 mg IN versed PTA. Continues to have tremors to the upper extremities bilaterally and lost control of his bladder. Now intermittently responding but unable to provide history. Known autism and epilepsy per EHR with multiple AEDs listed. Plan for labs. Review notes in EHR for clues regarding meds. Patient's caretaker arrived and we will discuss HPI with that individual.     EKG Interpretation    Interpreted by emergency department physician    Rhythm: normal sinus   Rate: normal  Axis: normal  Ectopy: none  Conduction: normal  ST Segments: normal  T Waves: normal  Q Waves: III    Clinical Impression: non-specific

## 2024-11-01 NOTE — ED NOTES
ED to inpatient nurses report      Chief Complaint:  Chief Complaint   Patient presents with    Seizures     Present to ED from: group home    MOA:     LOC: alert to only name  Mobility: Requires assistance * 2  Oxygen Baseline: room air    Current needs required: room air   Pending ED orders: urine  Present condition: stable    Why did the patient come to the ED? Seizure at group home  What is the plan? Neurology observation/possible med changes  Any procedures or intervention occur? IV, labs, imaging, IM versed  Any safety concerns?? none    Mental Status:   WDL    Psych Assessment:      Vital signs   Vitals:    11/01/24 1003 11/01/24 1007 11/01/24 1019 11/01/24 1030   BP: 133/80  137/76 129/70   Pulse: 94  86 76   Resp: 12  12 16   Temp:       TempSrc:       SpO2: 93%  97% 97%   Weight:  90.7 kg (200 lb)          Vitals:  Patient Vitals for the past 24 hrs:   BP Temp Temp src Pulse Resp SpO2 Weight   11/01/24 1030 129/70 -- -- 76 16 97 % --   11/01/24 1019 137/76 -- -- 86 12 97 % --   11/01/24 1007 -- -- -- -- -- -- 90.7 kg (200 lb)   11/01/24 1003 133/80 -- -- 94 12 93 % --   11/01/24 0936 -- -- -- (!) 108 16 95 % --   11/01/24 0932 -- 98.1 °F (36.7 °C) Oral -- -- -- --      Visit Vitals  /70   Pulse 76   Temp 98.1 °F (36.7 °C) (Oral)   Resp 16   Wt 90.7 kg (200 lb)   SpO2 97%   BMI 27.12 kg/m²        LDAs:   Peripheral IV 11/01/24 Proximal;Right;Anterior Forearm (Active)   Site Assessment Clean, dry & intact 11/01/24 1005   Line Status Blood return noted;Brisk blood return;Specimen collected;Flushed;Normal saline locked 11/01/24 1005   Phlebitis Assessment No symptoms 11/01/24 1005   Infiltration Assessment 0 11/01/24 1005   Dressing Status New dressing applied;Clean, dry & intact 11/01/24 1005   Dressing Type Transparent 11/01/24 1005       Ambulatory Status:  No data recorded    Diagnosis:  DISPOSITION Admitted 11/01/2024 01:13:44 PM   Final diagnoses:   None        Consults:  IP CONSULT TO NEUROLOGY
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
## 2024-11-01 NOTE — CARE COORDINATION
Transitional Planning  Call from [facility] they are the agency that provided SN services to patient.  Call back number [phone].
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
## 2024-11-01 NOTE — ED NOTES
Patient had episode of tremors. Patient not verbally responsive. Patient had urinary incontinence at the time. Some tachycardia noted. Patient given IM versed per verbal order from Dr. García at 0937.

## 2024-11-02 VITALS
HEIGHT: 72 IN | RESPIRATION RATE: 17 BRPM | BODY MASS INDEX: 27.09 KG/M2 | SYSTOLIC BLOOD PRESSURE: 151 MMHG | TEMPERATURE: 97.9 F | OXYGEN SATURATION: 89 % | HEART RATE: 85 BPM | WEIGHT: 200 LBS | DIASTOLIC BLOOD PRESSURE: 85 MMHG

## 2024-11-02 LAB
AMPHET UR QL SCN: NEGATIVE
BARBITURATES UR QL SCN: NEGATIVE
BENZODIAZ UR QL: POSITIVE
BILIRUB UR QL STRIP: NEGATIVE
CANNABINOIDS UR QL SCN: NEGATIVE
CLARITY UR: CLEAR
COCAINE UR QL SCN: NEGATIVE
COLOR UR: YELLOW
COMMENT: ABNORMAL
EKG ATRIAL RATE: 80 BPM
EKG P AXIS: 68 DEGREES
EKG P-R INTERVAL: 154 MS
EKG Q-T INTERVAL: 366 MS
EKG QRS DURATION: 88 MS
EKG QTC CALCULATION (BAZETT): 422 MS
EKG R AXIS: 58 DEGREES
EKG T AXIS: 28 DEGREES
EKG VENTRICULAR RATE: 80 BPM
FENTANYL UR QL: NEGATIVE
GLUCOSE UR STRIP-MCNC: NEGATIVE MG/DL
HGB UR QL STRIP.AUTO: NEGATIVE
KETONES UR STRIP-MCNC: ABNORMAL MG/DL
LEUKOCYTE ESTERASE UR QL STRIP: NEGATIVE
METHADONE UR QL: NEGATIVE
NITRITE UR QL STRIP: NEGATIVE
OPIATES UR QL SCN: NEGATIVE
OXYCODONE UR QL SCN: NEGATIVE
PCP UR QL SCN: NEGATIVE
PH UR STRIP: 6 [PH] (ref 5–8)
PROT UR STRIP-MCNC: NEGATIVE MG/DL
SP GR UR STRIP: 1.03 (ref 1–1.03)
TEST INFORMATION: ABNORMAL
UROBILINOGEN UR STRIP-ACNC: NORMAL EU/DL (ref 0–1)

## 2024-11-02 PROCEDURE — 93010 ELECTROCARDIOGRAM REPORT: CPT | Performed by: INTERNAL MEDICINE

## 2024-11-02 PROCEDURE — 80307 DRUG TEST PRSMV CHEM ANLYZR: CPT

## 2024-11-02 PROCEDURE — 81003 URINALYSIS AUTO W/O SCOPE: CPT

## 2024-11-02 PROCEDURE — 6370000000 HC RX 637 (ALT 250 FOR IP)

## 2024-11-02 PROCEDURE — 2580000003 HC RX 258

## 2024-11-02 PROCEDURE — 99239 HOSP IP/OBS DSCHRG MGMT >30: CPT | Performed by: PSYCHIATRY & NEUROLOGY

## 2024-11-02 PROCEDURE — 95714 VEEG EA 12-26 HR UNMNTR: CPT

## 2024-11-02 PROCEDURE — 95717 EEG PHYS/QHP 2-12 HR W/O VID: CPT | Performed by: PSYCHIATRY & NEUROLOGY

## 2024-11-02 PROCEDURE — 95720 EEG PHY/QHP EA INCR W/VEEG: CPT | Performed by: PSYCHIATRY & NEUROLOGY

## 2024-11-02 PROCEDURE — 6360000002 HC RX W HCPCS

## 2024-11-02 RX ORDER — GABAPENTIN 100 MG/1
100 CAPSULE ORAL 3 TIMES DAILY
Qty: 90 CAPSULE | Refills: 0 | Status: SHIPPED | OUTPATIENT
Start: 2024-11-02 | End: 2024-12-02

## 2024-11-02 RX ADMIN — PERAMPANEL 4 MG: 2 TABLET ORAL at 08:29

## 2024-11-02 RX ADMIN — ENOXAPARIN SODIUM 40 MG: 100 INJECTION SUBCUTANEOUS at 08:29

## 2024-11-02 RX ADMIN — SODIUM CHLORIDE, PRESERVATIVE FREE 5 ML: 5 INJECTION INTRAVENOUS at 08:59

## 2024-11-02 RX ADMIN — GABAPENTIN 100 MG: 100 CAPSULE ORAL at 13:56

## 2024-11-02 RX ADMIN — DIVALPROEX SODIUM 1500 MG: 500 TABLET, DELAYED RELEASE ORAL at 08:30

## 2024-11-02 RX ADMIN — RISPERIDONE 1 MG: 0.5 TABLET, FILM COATED ORAL at 08:30

## 2024-11-02 RX ADMIN — FOLIC ACID 1000 MCG: 1 TABLET ORAL at 08:29

## 2024-11-02 RX ADMIN — ACETAMINOPHEN 650 MG: 325 TABLET ORAL at 12:17

## 2024-11-02 RX ADMIN — LACOSAMIDE 300 MG: 100 TABLET, FILM COATED ORAL at 08:29

## 2024-11-02 RX ADMIN — CLOBAZAM 20 MG: 10 TABLET ORAL at 08:29

## 2024-11-02 RX ADMIN — GABAPENTIN 100 MG: 100 CAPSULE ORAL at 08:31

## 2024-11-02 ASSESSMENT — PAIN SCALES - GENERAL
PAINLEVEL_OUTOF10: 3
PAINLEVEL_OUTOF10: 0

## 2024-11-02 ASSESSMENT — PAIN SCALES - WONG BAKER: WONGBAKER_NUMERICALRESPONSE: NO HURT

## 2024-11-02 NOTE — PROGRESS NOTES
[facility]   IN-PATIENT SERVICE   [facility]    Progress Note             Date:   11/2/2024  Patient name:  Jerrell Bell  Date of admission:  11/1/2024  9:29 AM  MRN:   [id number]  Account:  [id number]  YOB: 2000  PCP:    No primary care provider on file.  Room:   [address] [place] [address]  Code Status:    Full Code    Chief Complaint:     Chief Complaint   Patient presents with    Seizures       Interval hx:     Patient seen and examined at bedside this morning.  No acute events overnight.  Neurological exam unchanged from yesterday.  Vitals stable.      Remains on LTME, no seizures recorded overnight      Brief History of Present Illness:     As per H&P:    The patient is a right handed, 24 y.o. male with past medical hx of LGS with behavioral comorbidities and autism presented with multiple breakthrough seizures.  Patient currently on Depakote 1500 mg twice daily, Fycompa 4 mg daily, Vimpat 150 mg twice daily and Onfi 20 mg twice daily.  Patient typically has 1 clinical generalized tonic-clonic seizure per week.  Today he had 2 brief generalized seizures lasting 1 to 2 minutes followed by a long 20+ minute seizure.  He was taken to the emergency department.  Received 5 mg IN midazolam en route.     Patient recently had adjustments in both benztropine and Risperdal whereby both doses were reduced.  Last antiseizure medication adjustment was in September 2024 where Fycompa was increased from 2 mg to 4 mg.  On evaluation, patient is not at baseline but improving.  He is able to verbalize intermittently and short answers, following commands.  Caregiver says he usually is able to converse but typically does not speak much after his seizures.  He was connected to [email or web address].     Does have significant tremors which are likely drug-induced as well as mild to moderate generalized increased tone.     Of note patient recently was assigned a new nurse who is still figuring out  on file.

## 2024-11-02 NOTE — DISCHARGE INSTR - COC
12m+), SC, 0.5mL 2003, 2015       Active Problems:  Patient Active Problem List   Diagnosis Code    Right elbow pain M25.521    Active autistic disorder F84.0    Nonintractable epilepsy without status epilepticus (HCC) G40.909    Development delay R62.50    Breakthrough seizure (HCC) G40.919    Lennox-Gastaut syndrome, intractable, without status epilepticus (HCC) G40.814       Isolation/Infection:   Isolation            No Isolation          Patient Infection Status       Infection Onset Added Last Indicated Last Indicated By Review Planned Expiration Resolved Resolved By    None active    Resolved    COVID-19 21 COVID-19 (Sent to [facility])   21 Infection                        Nurse Assessment:  Last Vital Signs: /81   Pulse 84   Temp 97.8 °F (36.6 °C) (Oral)   Resp 13   Ht 1.829 m (6')   Wt 90.7 kg (200 lb)   SpO2 97%   BMI 27.12 kg/m²     Last documented pain score (0-10 scale): Pain Level: 0  Last Weight:   Wt Readings from Last 1 Encounters:   24 90.7 kg (200 lb)     Mental Status:  {IP PT MENTAL STATUS:}    IV Access:  { OMER IV ACCESS:071609700}    Nursing Mobility/ADLs:  Walking   {CHP DME ADLs:149856660}  Transfer  {CHP DME ADLs:030846364}  Bathing  {CHP DME ADLs:323812444}  Dressing  {CHP DME ADLs:966119781}  Toileting  {CHP DME ADLs:561428833}  Feeding  {CHP DME ADLs:151364548}  Med Admin  {CHP DME ADLs:292455647}  Med Delivery   { OMER MED Delivery:315326528}    Wound Care Documentation and Therapy:        Elimination:  Continence:   Bowel: {YES / NO:}  Bladder: {YES / NO:}  Urinary Catheter: {Urinary Catheter:599562087}   Colostomy/Ileostomy/Ileal Conduit: {YES / NO:}       Date of Last BM: ***    Intake/Output Summary (Last 24 hours) at 2024 1502  Last data filed at 2024 1145  Gross per 24 hour   Intake --   Output 650 ml   Net -650 ml     No intake/output data recorded.    Safety Concerns:     {MH OMER  H&P: {CHP DME Changes in HandP:025419675}    PHYSICIAN SIGNATURE:  {Esignature:794725803}

## 2024-11-02 NOTE — PROCEDURES
LONG-TERM EEG-VIDEO MONITORING   CLINICAL NEUROPHYSIOLOGY LABORATORY  DEPARTMENT OF NEUROLOGY  [facility]    Patient: Jerrell Bell  Age: 24 y.o.  MRN: [id number]    Referring Physician: No ref. provider found  History: The patient is a 24 y.o. male who presented breakthrough seizure/encephalopathy. This long-term video-EEG monitoring study was performed to determine the nature of the patient's clinical events. The patient is on neuroactive medications.   Jerrell Bell   Current Facility-Administered Medications   Medication Dose Route Frequency Provider Last Rate Last Admin    sodium chloride flush 0.9 % injection 5-40 mL  5-40 mL IntraVENous 2 times per day Huang Bryant MD   10 mL at 11/01/24 2044    sodium chloride flush 0.9 % injection 5-40 mL  5-40 mL IntraVENous PRN Huang Bryant MD        0.9 % sodium chloride infusion   IntraVENous PRN Huang Bryant MD        potassium chloride (KLOR-CON M) extended release tablet 40 mEq  40 mEq Oral PRN Huang Bryant MD        Or    potassium bicarb-citric acid (EFFER-K) effervescent tablet 40 mEq  40 mEq Oral PRN Huang Bryant MD        Or    potassium chloride 10 mEq/100 mL IVPB (Peripheral Line)  10 mEq IntraVENous PRN Huang Bryant MD        magnesium sulfate 2000 mg in 50 mL IVPB premix  2,000 mg IntraVENous PRN Huang Bryant MD        enoxaparin (LOVENOX) injection 40 mg  40 mg SubCUTAneous Daily Huang Bryant MD   40 mg at 11/01/24 1457    ondansetron (ZOFRAN-ODT) disintegrating tablet 4 mg  4 mg Oral Q8H PRN Huang Bryant MD        Or    ondansetron (ZOFRAN) injection 4 mg  4 mg IntraVENous Q6H PRN Huang Bryant MD        polyethylene glycol (GLYCOLAX) packet 17 g  17 g Oral Daily PRN Huang Bryant MD        acetaminophen (TYLENOL) tablet 650 mg  650 mg Oral Q6H PRN Huang Bryant MD        Or    acetaminophen (TYLENOL) suppository 650 mg  650 mg Rectal Q6H PRN Huang Bryant MD        ARIPiprazole (ABILIFY) tablet 15 mg  15 mg Oral Daily  During this period the patient had no events or seizures.    Summary: During this day of recording no events were recorded. The interictal EEG was abnormal due to diffuse polymorphic theta slowing suggesting mild encephalopathy. Frequent bursts of generalzied spike and polyspike wave discharges conferred an increased risk for generalized seizures. Monitoring was continued in order to record the patient's typical events. The EKG channel revealed no abnormalities.    SHANNON BOWLES MD  Diplomate, American Board of Psychiatry and Neurology  Diplomate, American Board of Clinical Neurophysiology  Diplomate, American Board of Epilepsy       Please note this is a preliminary report and updated daily. The final report will have a summary of behavior and electrographic findings with clinical correlation.

## 2024-11-02 NOTE — PLAN OF CARE
Problem: Discharge Planning  Goal: Discharge to home or other facility with appropriate resources  11/2/2024 0618 by Indy Henry, RN  Outcome: Progressing  Flowsheets (Taken 11/1/2024 2000)  Discharge to home or other facility with appropriate resources: Identify barriers to discharge with patient and caregiver  11/1/2024 1847 by Carla Moe, RN  Outcome: Progressing     Problem: Safety - Adult  Goal: Free from fall injury  11/2/2024 0618 by Indy Henry RN  Outcome: Progressing  Flowsheets (Taken 11/1/2024 2000)  Free From Fall Injury: Instruct family/caregiver on patient safety  11/1/2024 1847 by Carla Moe, RN  Outcome: Progressing     Problem: Pain  Goal: Verbalizes/displays adequate comfort level or baseline comfort level  Outcome: Progressing  Flowsheets (Taken 11/1/2024 2003)  Verbalizes/displays adequate comfort level or baseline comfort level: Encourage patient to monitor pain and request assistance

## 2024-11-03 LAB — LACOSAMIDE SERPL-MCNC: 4.1 UG/ML (ref 1–10)

## 2024-11-05 LAB
VALPROIC ACID % FREE: 44 % (ref 5–18)
VALPROIC ACID TOTAL: 82 UG/ML (ref 50–125)
VALPROIC ACID, FREE: 36 UG/ML (ref 7–23)

## 2025-04-06 ENCOUNTER — APPOINTMENT (OUTPATIENT)
Dept: GENERAL RADIOLOGY | Age: 25
DRG: 053 | End: 2025-04-06
Payer: MEDICAID

## 2025-04-06 ENCOUNTER — HOSPITAL ENCOUNTER (INPATIENT)
Age: 25
LOS: 2 days | Discharge: HOME HEALTH | DRG: 053 | End: 2025-04-08
Attending: EMERGENCY MEDICINE | Admitting: PSYCHIATRY & NEUROLOGY
Payer: MEDICAID

## 2025-04-06 DIAGNOSIS — G40.814 INTRACTABLE LENNOX-GASTAUT SYNDROME WITHOUT STATUS EPILEPTICUS (HCC): ICD-10-CM

## 2025-04-06 DIAGNOSIS — G40.919 BREAKTHROUGH SEIZURE (HCC): Primary | ICD-10-CM

## 2025-04-06 LAB
ALBUMIN SERPL-MCNC: 4.2 G/DL (ref 3.5–5.2)
ALBUMIN/GLOB SERPL: 1.6 {RATIO} (ref 1–2.5)
ALP SERPL-CCNC: 68 U/L (ref 40–129)
ALT SERPL-CCNC: 35 U/L (ref 10–50)
AMMONIA PLAS-SCNC: 16 UMOL/L (ref 16–60)
ANION GAP SERPL CALCULATED.3IONS-SCNC: 12 MMOL/L (ref 9–16)
AST SERPL-CCNC: 40 U/L (ref 10–50)
B PARAP IS1001 DNA NPH QL NAA+NON-PROBE: NOT DETECTED
B PERT DNA SPEC QL NAA+PROBE: NOT DETECTED
BASOPHILS # BLD: <0.03 K/UL (ref 0–0.2)
BASOPHILS NFR BLD: 0 % (ref 0–2)
BILIRUB DIRECT SERPL-MCNC: 0.1 MG/DL (ref 0–0.2)
BILIRUB INDIRECT SERPL-MCNC: 0.2 MG/DL (ref 0–1)
BILIRUB SERPL-MCNC: 0.3 MG/DL (ref 0–1.2)
BUN SERPL-MCNC: 15 MG/DL (ref 6–20)
C PNEUM DNA NPH QL NAA+NON-PROBE: NOT DETECTED
CALCIUM SERPL-MCNC: 9.3 MG/DL (ref 8.6–10.4)
CHLORIDE SERPL-SCNC: 101 MMOL/L (ref 98–107)
CK SERPL-CCNC: 99 U/L (ref 39–308)
CO2 SERPL-SCNC: 27 MMOL/L (ref 20–31)
CREAT SERPL-MCNC: 1 MG/DL (ref 0.7–1.2)
EOSINOPHIL # BLD: 0.28 K/UL (ref 0–0.44)
EOSINOPHILS RELATIVE PERCENT: 5 % (ref 1–4)
ERYTHROCYTE [DISTWIDTH] IN BLOOD BY AUTOMATED COUNT: 12.1 % (ref 11.8–14.4)
FLUAV RNA NPH QL NAA+NON-PROBE: NOT DETECTED
FLUBV RNA NPH QL NAA+NON-PROBE: NOT DETECTED
GFR, ESTIMATED: >90 ML/MIN/1.73M2
GLOBULIN SER CALC-MCNC: 2.6 G/DL
GLUCOSE BLD-MCNC: 130 MG/DL (ref 75–110)
GLUCOSE SERPL-MCNC: 110 MG/DL (ref 74–99)
HADV DNA NPH QL NAA+NON-PROBE: NOT DETECTED
HCOV 229E RNA NPH QL NAA+NON-PROBE: NOT DETECTED
HCOV HKU1 RNA NPH QL NAA+NON-PROBE: NOT DETECTED
HCOV NL63 RNA NPH QL NAA+NON-PROBE: NOT DETECTED
HCOV OC43 RNA NPH QL NAA+NON-PROBE: NOT DETECTED
HCT VFR BLD AUTO: 42.3 % (ref 40.7–50.3)
HGB BLD-MCNC: 14.8 G/DL (ref 13–17)
HMPV RNA NPH QL NAA+NON-PROBE: NOT DETECTED
HPIV1 RNA NPH QL NAA+NON-PROBE: NOT DETECTED
HPIV2 RNA NPH QL NAA+NON-PROBE: NOT DETECTED
HPIV3 RNA NPH QL NAA+NON-PROBE: NOT DETECTED
HPIV4 RNA NPH QL NAA+NON-PROBE: NOT DETECTED
IMM GRANULOCYTES # BLD AUTO: 0.03 K/UL (ref 0–0.3)
IMM GRANULOCYTES NFR BLD: 1 %
LACTIC ACID, WHOLE BLOOD: 2.2 MMOL/L (ref 0.7–2.1)
LYMPHOCYTES NFR BLD: 1.83 K/UL (ref 1.1–3.7)
LYMPHOCYTES RELATIVE PERCENT: 30 % (ref 24–43)
M PNEUMO DNA NPH QL NAA+NON-PROBE: NOT DETECTED
MCH RBC QN AUTO: 32.1 PG (ref 25.2–33.5)
MCHC RBC AUTO-ENTMCNC: 35 G/DL (ref 28.4–34.8)
MCV RBC AUTO: 91.8 FL (ref 82.6–102.9)
MONOCYTES NFR BLD: 0.46 K/UL (ref 0.1–1.2)
MONOCYTES NFR BLD: 8 % (ref 3–12)
NEUTROPHILS NFR BLD: 58 % (ref 36–65)
NEUTS SEG NFR BLD: 3.55 K/UL (ref 1.5–8.1)
NRBC BLD-RTO: 0 PER 100 WBC
PLATELET # BLD AUTO: ABNORMAL K/UL (ref 138–453)
PLATELET, FLUORESCENCE: 160 K/UL (ref 138–453)
PLATELETS.RETICULATED NFR BLD AUTO: 1.8 % (ref 1.1–10.3)
POTASSIUM SERPL-SCNC: 4 MMOL/L (ref 3.7–5.3)
PROT SERPL-MCNC: 6.8 G/DL (ref 6.6–8.7)
RBC # BLD AUTO: 4.61 M/UL (ref 4.21–5.77)
RSV RNA NPH QL NAA+NON-PROBE: NOT DETECTED
RV+EV RNA NPH QL NAA+NON-PROBE: NOT DETECTED
SARS-COV-2 RNA NPH QL NAA+NON-PROBE: NOT DETECTED
SODIUM SERPL-SCNC: 140 MMOL/L (ref 136–145)
SPECIMEN DESCRIPTION: NORMAL
WBC OTHER # BLD: 6.2 K/UL (ref 3.5–11.3)

## 2025-04-06 PROCEDURE — 71045 X-RAY EXAM CHEST 1 VIEW: CPT

## 2025-04-06 PROCEDURE — 82550 ASSAY OF CK (CPK): CPT

## 2025-04-06 PROCEDURE — 99285 EMERGENCY DEPT VISIT HI MDM: CPT

## 2025-04-06 PROCEDURE — 2500000003 HC RX 250 WO HCPCS

## 2025-04-06 PROCEDURE — 80235 DRUG ASSAY LACOSAMIDE: CPT

## 2025-04-06 PROCEDURE — 0202U NFCT DS 22 TRGT SARS-COV-2: CPT

## 2025-04-06 PROCEDURE — 83605 ASSAY OF LACTIC ACID: CPT

## 2025-04-06 PROCEDURE — 82140 ASSAY OF AMMONIA: CPT

## 2025-04-06 PROCEDURE — 2060000000 HC ICU INTERMEDIATE R&B

## 2025-04-06 PROCEDURE — 82947 ASSAY GLUCOSE BLOOD QUANT: CPT

## 2025-04-06 PROCEDURE — 80048 BASIC METABOLIC PNL TOTAL CA: CPT

## 2025-04-06 PROCEDURE — 80076 HEPATIC FUNCTION PANEL: CPT

## 2025-04-06 PROCEDURE — 6370000000 HC RX 637 (ALT 250 FOR IP)

## 2025-04-06 PROCEDURE — 80164 ASSAY DIPROPYLACETIC ACD TOT: CPT

## 2025-04-06 PROCEDURE — G0480 DRUG TEST DEF 1-7 CLASSES: HCPCS

## 2025-04-06 PROCEDURE — 85055 RETICULATED PLATELET ASSAY: CPT

## 2025-04-06 PROCEDURE — 80165 DIPROPYLACETIC ACID FREE: CPT

## 2025-04-06 PROCEDURE — 85025 COMPLETE CBC W/AUTO DIFF WBC: CPT

## 2025-04-06 PROCEDURE — 93005 ELECTROCARDIOGRAM TRACING: CPT

## 2025-04-06 RX ORDER — SODIUM CHLORIDE 0.9 % (FLUSH) 0.9 %
5-40 SYRINGE (ML) INJECTION EVERY 12 HOURS SCHEDULED
Status: DISCONTINUED | OUTPATIENT
Start: 2025-04-06 | End: 2025-04-08 | Stop reason: HOSPADM

## 2025-04-06 RX ORDER — SODIUM CHLORIDE 9 MG/ML
INJECTION, SOLUTION INTRAVENOUS PRN
Status: DISCONTINUED | OUTPATIENT
Start: 2025-04-06 | End: 2025-04-08 | Stop reason: HOSPADM

## 2025-04-06 RX ORDER — MAGNESIUM SULFATE IN WATER 40 MG/ML
2000 INJECTION, SOLUTION INTRAVENOUS PRN
Status: DISCONTINUED | OUTPATIENT
Start: 2025-04-06 | End: 2025-04-08 | Stop reason: HOSPADM

## 2025-04-06 RX ORDER — FOLIC ACID 1 MG/1
1000 TABLET ORAL DAILY
Status: DISCONTINUED | OUTPATIENT
Start: 2025-04-07 | End: 2025-04-08 | Stop reason: HOSPADM

## 2025-04-06 RX ORDER — ONDANSETRON 2 MG/ML
4 INJECTION INTRAMUSCULAR; INTRAVENOUS EVERY 6 HOURS PRN
Status: DISCONTINUED | OUTPATIENT
Start: 2025-04-06 | End: 2025-04-08 | Stop reason: HOSPADM

## 2025-04-06 RX ORDER — POTASSIUM CHLORIDE 7.45 MG/ML
10 INJECTION INTRAVENOUS PRN
Status: DISCONTINUED | OUTPATIENT
Start: 2025-04-06 | End: 2025-04-08 | Stop reason: HOSPADM

## 2025-04-06 RX ORDER — SODIUM CHLORIDE 0.9 % (FLUSH) 0.9 %
5-40 SYRINGE (ML) INJECTION PRN
Status: DISCONTINUED | OUTPATIENT
Start: 2025-04-06 | End: 2025-04-08 | Stop reason: HOSPADM

## 2025-04-06 RX ORDER — DIVALPROEX SODIUM 500 MG/1
1500 TABLET, DELAYED RELEASE ORAL 2 TIMES DAILY
Status: DISCONTINUED | OUTPATIENT
Start: 2025-04-06 | End: 2025-04-08 | Stop reason: HOSPADM

## 2025-04-06 RX ORDER — LACOSAMIDE 100 MG/1
300 TABLET ORAL 2 TIMES DAILY
Status: DISCONTINUED | OUTPATIENT
Start: 2025-04-06 | End: 2025-04-08 | Stop reason: HOSPADM

## 2025-04-06 RX ORDER — ACETAMINOPHEN 325 MG/1
650 TABLET ORAL EVERY 6 HOURS PRN
Status: DISCONTINUED | OUTPATIENT
Start: 2025-04-06 | End: 2025-04-08 | Stop reason: HOSPADM

## 2025-04-06 RX ORDER — ESCITALOPRAM OXALATE 10 MG/1
5 TABLET ORAL DAILY
Status: DISCONTINUED | OUTPATIENT
Start: 2025-04-07 | End: 2025-04-08 | Stop reason: HOSPADM

## 2025-04-06 RX ORDER — ENOXAPARIN SODIUM 100 MG/ML
40 INJECTION SUBCUTANEOUS DAILY
Status: DISCONTINUED | OUTPATIENT
Start: 2025-04-06 | End: 2025-04-08 | Stop reason: HOSPADM

## 2025-04-06 RX ORDER — ACETAMINOPHEN 650 MG/1
650 SUPPOSITORY RECTAL EVERY 6 HOURS PRN
Status: DISCONTINUED | OUTPATIENT
Start: 2025-04-06 | End: 2025-04-08 | Stop reason: HOSPADM

## 2025-04-06 RX ORDER — POTASSIUM CHLORIDE 1500 MG/1
40 TABLET, EXTENDED RELEASE ORAL PRN
Status: DISCONTINUED | OUTPATIENT
Start: 2025-04-06 | End: 2025-04-08 | Stop reason: HOSPADM

## 2025-04-06 RX ORDER — CLOBAZAM 10 MG/1
20 TABLET ORAL 2 TIMES DAILY
Status: DISCONTINUED | OUTPATIENT
Start: 2025-04-06 | End: 2025-04-08 | Stop reason: HOSPADM

## 2025-04-06 RX ORDER — ARIPIPRAZOLE 15 MG/1
15 TABLET ORAL EVERY EVENING
Status: DISCONTINUED | OUTPATIENT
Start: 2025-04-06 | End: 2025-04-08 | Stop reason: HOSPADM

## 2025-04-06 RX ORDER — ONDANSETRON 4 MG/1
4 TABLET, ORALLY DISINTEGRATING ORAL EVERY 8 HOURS PRN
Status: DISCONTINUED | OUTPATIENT
Start: 2025-04-06 | End: 2025-04-08 | Stop reason: HOSPADM

## 2025-04-06 RX ORDER — POLYETHYLENE GLYCOL 3350 17 G/17G
17 POWDER, FOR SOLUTION ORAL DAILY PRN
Status: DISCONTINUED | OUTPATIENT
Start: 2025-04-06 | End: 2025-04-08 | Stop reason: HOSPADM

## 2025-04-06 RX ORDER — LORAZEPAM 2 MG/ML
2 INJECTION INTRAMUSCULAR EVERY 10 MIN PRN
Status: ACTIVE | OUTPATIENT
Start: 2025-04-06 | End: 2025-04-07

## 2025-04-06 RX ADMIN — LACOSAMIDE 300 MG: 100 TABLET, FILM COATED ORAL at 22:04

## 2025-04-06 RX ADMIN — SODIUM CHLORIDE, PRESERVATIVE FREE 5 ML: 5 INJECTION INTRAVENOUS at 21:13

## 2025-04-06 RX ADMIN — DIVALPROEX SODIUM 1500 MG: 500 TABLET, DELAYED RELEASE ORAL at 22:14

## 2025-04-06 ASSESSMENT — PAIN - FUNCTIONAL ASSESSMENT: PAIN_FUNCTIONAL_ASSESSMENT: FACE, LEGS, ACTIVITY, CRY, AND CONSOLABILITY (FLACC)

## 2025-04-07 LAB
BASOPHILS # BLD: 0.04 K/UL (ref 0–0.2)
BASOPHILS NFR BLD: 1 % (ref 0–2)
BILIRUB UR QL STRIP: NEGATIVE
CLARITY UR: CLEAR
COLOR UR: ABNORMAL
EKG ATRIAL RATE: 80 BPM
EKG P AXIS: 59 DEGREES
EKG P-R INTERVAL: 170 MS
EKG Q-T INTERVAL: 370 MS
EKG QRS DURATION: 92 MS
EKG QTC CALCULATION (BAZETT): 426 MS
EKG R AXIS: 36 DEGREES
EKG T AXIS: 42 DEGREES
EKG VENTRICULAR RATE: 80 BPM
EOSINOPHIL # BLD: 0.41 K/UL (ref 0–0.44)
EOSINOPHILS RELATIVE PERCENT: 5 % (ref 1–4)
EPI CELLS #/AREA URNS HPF: ABNORMAL /HPF (ref 0–5)
ERYTHROCYTE [DISTWIDTH] IN BLOOD BY AUTOMATED COUNT: 12.3 % (ref 11.8–14.4)
GLUCOSE UR STRIP-MCNC: NEGATIVE MG/DL
HCT VFR BLD AUTO: 42.2 % (ref 40.7–50.3)
HGB BLD-MCNC: 14.5 G/DL (ref 13–17)
HGB UR QL STRIP.AUTO: NEGATIVE
IMM GRANULOCYTES # BLD AUTO: <0.03 K/UL (ref 0–0.3)
IMM GRANULOCYTES NFR BLD: 0 %
KETONES UR STRIP-MCNC: ABNORMAL MG/DL
LEUKOCYTE ESTERASE UR QL STRIP: NEGATIVE
LYMPHOCYTES NFR BLD: 2.9 K/UL (ref 1.1–3.7)
LYMPHOCYTES RELATIVE PERCENT: 38 % (ref 24–43)
MCH RBC QN AUTO: 31.7 PG (ref 25.2–33.5)
MCHC RBC AUTO-ENTMCNC: 34.4 G/DL (ref 28.4–34.8)
MCV RBC AUTO: 92.1 FL (ref 82.6–102.9)
MONOCYTES NFR BLD: 0.78 K/UL (ref 0.1–1.2)
MONOCYTES NFR BLD: 10 % (ref 3–12)
MUCOUS THREADS URNS QL MICRO: ABNORMAL
NEUTROPHILS NFR BLD: 46 % (ref 36–65)
NEUTS SEG NFR BLD: 3.51 K/UL (ref 1.5–8.1)
NITRITE UR QL STRIP: NEGATIVE
NRBC BLD-RTO: 0 PER 100 WBC
PH UR STRIP: 6 [PH] (ref 5–8)
PLATELET # BLD AUTO: ABNORMAL K/UL (ref 138–453)
PLATELET, FLUORESCENCE: 154 K/UL (ref 138–453)
PLATELETS.RETICULATED NFR BLD AUTO: 1.8 % (ref 1.1–10.3)
PROT UR STRIP-MCNC: NEGATIVE MG/DL
RBC # BLD AUTO: 4.58 M/UL (ref 4.21–5.77)
RBC #/AREA URNS HPF: ABNORMAL /HPF (ref 0–2)
SP GR UR STRIP: 1.03 (ref 1–1.03)
UROBILINOGEN UR STRIP-ACNC: NORMAL EU/DL (ref 0–1)
WBC #/AREA URNS HPF: ABNORMAL /HPF (ref 0–5)
WBC OTHER # BLD: 7.7 K/UL (ref 3.5–11.3)

## 2025-04-07 PROCEDURE — 93010 ELECTROCARDIOGRAM REPORT: CPT | Performed by: INTERNAL MEDICINE

## 2025-04-07 PROCEDURE — 2060000000 HC ICU INTERMEDIATE R&B

## 2025-04-07 PROCEDURE — 6370000000 HC RX 637 (ALT 250 FOR IP)

## 2025-04-07 PROCEDURE — 99223 1ST HOSP IP/OBS HIGH 75: CPT | Performed by: PSYCHIATRY & NEUROLOGY

## 2025-04-07 PROCEDURE — 85025 COMPLETE CBC W/AUTO DIFF WBC: CPT

## 2025-04-07 PROCEDURE — 81001 URINALYSIS AUTO W/SCOPE: CPT

## 2025-04-07 PROCEDURE — 2500000003 HC RX 250 WO HCPCS

## 2025-04-07 PROCEDURE — 85055 RETICULATED PLATELET ASSAY: CPT

## 2025-04-07 RX ORDER — LORAZEPAM 2 MG/ML
2 INJECTION INTRAMUSCULAR EVERY 5 MIN PRN
Status: DISCONTINUED | OUTPATIENT
Start: 2025-04-07 | End: 2025-04-08 | Stop reason: HOSPADM

## 2025-04-07 RX ADMIN — PERAMPANEL 4 MG: 2 TABLET ORAL at 11:33

## 2025-04-07 RX ADMIN — ACETAMINOPHEN 650 MG: 325 TABLET ORAL at 11:32

## 2025-04-07 RX ADMIN — ESCITALOPRAM 5 MG: 5 TABLET, FILM COATED ORAL at 10:44

## 2025-04-07 RX ADMIN — FOLIC ACID 1000 MCG: 1 TABLET ORAL at 10:44

## 2025-04-07 RX ADMIN — DIVALPROEX SODIUM 1500 MG: 500 TABLET, DELAYED RELEASE ORAL at 20:04

## 2025-04-07 RX ADMIN — ARIPIPRAZOLE 15 MG: 15 TABLET ORAL at 04:11

## 2025-04-07 RX ADMIN — LACOSAMIDE 300 MG: 100 TABLET, FILM COATED ORAL at 10:44

## 2025-04-07 RX ADMIN — CLOBAZAM 20 MG: 10 TABLET ORAL at 20:04

## 2025-04-07 RX ADMIN — CLOBAZAM 20 MG: 10 TABLET ORAL at 04:11

## 2025-04-07 RX ADMIN — ARIPIPRAZOLE 15 MG: 15 TABLET ORAL at 18:11

## 2025-04-07 RX ADMIN — DIVALPROEX SODIUM 1500 MG: 500 TABLET, DELAYED RELEASE ORAL at 10:44

## 2025-04-07 RX ADMIN — SODIUM CHLORIDE, PRESERVATIVE FREE 10 ML: 5 INJECTION INTRAVENOUS at 20:05

## 2025-04-07 RX ADMIN — LACOSAMIDE 300 MG: 100 TABLET, FILM COATED ORAL at 20:03

## 2025-04-07 RX ADMIN — CLOBAZAM 20 MG: 10 TABLET ORAL at 12:04

## 2025-04-07 ASSESSMENT — PAIN DESCRIPTION - ORIENTATION: ORIENTATION: RIGHT;LEFT

## 2025-04-07 ASSESSMENT — PAIN SCALES - GENERAL: PAINLEVEL_OUTOF10: 3

## 2025-04-07 ASSESSMENT — PAIN DESCRIPTION - LOCATION: LOCATION: LEG

## 2025-04-07 NOTE — ED PROVIDER NOTES
[facility] EMERGENCY DEPARTMENT  Emergency Department Encounter  Emergency Medicine Resident     Pt Name:Jerrell Bell  MRN: [id number]  Birthdate 2000  Date of evaluation: 4/6/25  PCP:  No primary care provider on file.  Note Started: 7:56 PM EDT      CHIEF COMPLAINT       Chief Complaint   Patient presents with    Seizures       HISTORY OF PRESENT ILLNESS  (Location/Symptom, Timing/Onset, Context/Setting, Quality, Duration, Modifying Factors, Severity.)      Jerrell Bell is a 25 y.o. male with a history of autism lennox-gastaut syndrome presents with breakthrough seizure.  Patient has full-time caretakers, they state he is usually compliant with his medications, however some days he will refuse his antiseizure medications.  He had seizure activity and described as full tonic-clonic movements lasting approximately 5 minutes.  Last seizure was reportedly just a few days prior and only lasting for 30  seconds.  There is no reported fever, chills, myalgias or night sweats.  No other generalized infectious symptoms.    PAST MEDICAL / SURGICAL / SOCIAL / FAMILY HISTORY      has a past medical history of Autism, COVID-19, Depression, and Epilepsy (HCC).       has a past surgical history that includes Forearm fracture surgery (Left).      Social History     Socioeconomic History    Marital status:      Spouse name: Not on file    Number of children: Not on file    Years of education: Not on file    Highest education level: Not on file   Occupational History    Not on file   Tobacco Use    Smoking status: Former     Types: Cigarettes    Smokeless tobacco: Never   Vaping Use    Vaping status: Never Used   Substance and Sexual Activity    Alcohol use: Never    Drug use: Not Currently     Types: Marijuana (Weed)    Sexual activity: Not on file   Other Topics Concern    Not on file   Social History Narrative    Not on file     Social Drivers of Health     Financial Resource 
and disposition of the patient as recorded by the APC.    Eddie Velasquez MD  Attending Emergency  Physician       Eddie Velasquez MD  04/06/25 5943

## 2025-04-07 NOTE — CARE COORDINATION
Case Management Assessment  Initial Evaluation    Date/Time of Evaluation: 4/7/2025 5:36 PM  Assessment Completed by: Alessandra Beltran RN    If patient is discharged prior to next notation, then this note serves as note for discharge by case management.    Patient Name: Jerrell Willard                   YOB: 2000  Diagnosis: Breakthrough seizure (HCC) [G40.919]                   Date / Time: 4/6/2025  7:54 PM    Patient Admission Status: Inpatient   Readmission Risk (Low < 19, Mod (19-27), High > 27): Readmission Risk Score: 12    Current PCP: No primary care provider on file.  PCP verified by CM? Yes ([facility])    Chart Reviewed: Yes      History Provided by: Child/Family (parent and legal guardian Wyatt willard)  Patient Orientation: Alert and Oriented, Person, Place    Patient Cognition: Alert    Hospitalization in the last 30 days (Readmission):  No    If yes, Readmission Assessment in CM Navigator will be completed.    Advance Directives:      Code Status: Full Code   Patient's Primary Decision Maker is: Named in Scanned ACP Document (legal guardian wyatt willard)      Discharge Planning:    Patient lives with: Alone Type of Home: House  Primary Care Giver: Other (Comment) (24hr care through [facility])  Patient Support Systems include: Parent, Home Care Staff   Current Financial resources: Medicaid  Current community resources:    Current services prior to admission: Home Care            Current DME:              Type of Home Care services:  Aide Services (24hr aide services)    ADLS  Prior functional level: Assistance with the following:  Current functional level: Assistance with the following:    PT AM-PAC:   /24  OT AM-PAC:   /24    Family can provide assistance at DC: No  Would you like Case Management to discuss the discharge plan with any other family members/significant others, and if so, who? Yes (mother and legal guardian wyatt willard)  Plans to

## 2025-04-07 NOTE — CARE COORDINATION
Received SW consult regarding concern that pt has a history of not receiving medication at group home.  Spoke with patients mother/legal guardian Wyatt Arabella.  Wyatt states that pt does not live in a group home.  He lives in his own home and has 24 hour caregivers through [facility].  She states that pt sometimes chooses not to take his medications and home care staff is not able to force him to take them.  She does not have any concerns with his home care providers at this time and plan is to return home with [facility].

## 2025-04-07 NOTE — ED NOTES
ED to inpatient nurses report      Chief Complaint:  Chief Complaint   Patient presents with    Seizures     Present to ED from: Home    MOA:     LOC: alert and orientated to name, place, date  Mobility: Independent  Oxygen Baseline: RA    Current needs required: NA   Pending ED orders: NA  Present condition: Stable    Why did the patient come to the ED?     Pt presents to ED by M18 with c/o seizures  Pt homehealth aid states he began having a seizure, pt was helped to ground and seized for roughly 5 minutes  Pt does have hx of seizures, pt is not compliant with seizure medication but took medication today  On scene, pt seized infront of EMS, pt eyes rolled in back of head and flailed arms  Pt healthcare aid states pt has seizures often but typically 30 seconds or less, pt has hx of Epilepsy  EMS placed 22G IV, was given 2mg IV versed   Pt IV removed upon arrval  Pt is alert, even unlabored RR NAD, pt is mentally handicapped  Pt changed into gown, placed on full cardiac monitor, IV in place, pt placed in seizure precautions  Pt call light within reach   Pt homecare aid at bedside      What is the plan? Admit for neuro consult  Any procedures or intervention occur? IV, labs, neurology consult  Any safety concerns?? Fall risk     Mental Status:       Psych Assessment:   Psychosocial  Psychosocial (WDL): (S) Exceptions to WDL (Postictal, MRDD)  Vital signs   Vitals:    04/06/25 2242 04/06/25 2300 04/07/25 0000 04/07/25 0115   BP: 128/77 113/65 108/66    Pulse: 79 76 67 59   Resp: 17 12 12 13   Temp:       TempSrc:       SpO2: 99% 97% 96% 95%        Vitals:  Patient Vitals for the past 24 hrs:   BP Temp Temp src Pulse Resp SpO2   04/07/25 0115 -- -- -- 59 13 95 %   04/07/25 0000 108/66 -- -- 67 12 96 %   04/06/25 2300 113/65 -- -- 76 12 97 %   04/06/25 2242 128/77 -- -- 79 17 99 %   04/06/25 2200 (!) 149/57 -- -- 75 15 98 %   04/06/25 2145 131/85 -- -- 75 14 99 %   04/06/25 2130 135/84 -- -- 82 10 100 %   04/06/25 9111 
Home health aidPhilip stahl informs RN he is leaving.   
Home health aides switch  
Neuro at bedside   
Pt A&O x 4, on continuous monitor, does not appear in acute distress, RR even and unlabored, resting comfortably on stretcher with eyes closed and call light in reach. Pt voices no other concerns at this time. Seizure precautions ongoing. Continuing with current POC.     
Pt A&O x 4, on continuous monitor, does not appear in acute distress, RR even and unlabored, resting comfortably on stretcher with eyes closed and call light in reach. Seizure precautions ongoing. Pt voices no other concerns at this time. Continuing with current POC.    
Pt A&O x 4, on continuous monitor, does not appear in acute distress, RR even and unlabored, resting comfortably on stretcher with eyes closed and call light in reach. Seizure precautions ongoing. Pt voices no other concerns at this time. Continuing with current POC.     
Pt ambulated independently to restroom. Unable to obtain UA at this time.   
Pt ambulatory to restroom with steady/even gait   
Pt presents to ED by M18 with c/o seizures  Pt homehealth aid states he began having a seizure, pt was helped to ground and seized for roughly 5 minutes  Pt does have hx of seizures, pt is not compliant with seizure medication but took medication today  On scene, pt seized infront of EMS, pt eyes rolled in back of head and flailed arms  Pt healthcare aid states pt has seizures often but typically 30 seconds or less, pt has hx of Epilepsy  EMS placed 22G IV, was given 2mg IV versed   Pt IV removed upon arrval  Pt is alert, even unlabored RR NAD  Pt changed into gown, placed on full cardiac monitor, IV in place  Pt call light within reach   Pt homecare aid at bedside   
Pt resting on stretcher, call light in reach, RR even and non labored on room air, attached to full cardiac monitor, seizure precautions in place.   
Received report from Shey ARRIAGA. Pt A&O x 4, on continuous monitor, does not appear in acute distress, RR even and unlabored, resting comfortably on stretcher with eyes closed and call light in reach. Seizure precautions ongoing. Pt voices no other concerns at this time. Continuing with current POC.     
The following labs were labeled with appropriate pt sticker and tubed to lab:     [] Blue     [] Lavender   [] on ice  [] Green/yellow  [] Green/black [] on ice  [] Grey  [] on ice  [] Yellow  [] Red  [] Pink  [] Type/ Screen  [] ABG  [] VBG    [] COVID-19 swab    [] Rapid  [] PCR  [] Flu swab  [] Peds Viral Panel     [x] Urine Sample  [] Fecal Sample  [] Pelvic Cultures  [] Blood Cultures  [] X 2  [] STREP Cultures  [] Wound Cultures    
The following labs were labeled with appropriate pt sticker and tubed to lab:     [] Blue     [] Lavender   [] on ice  [] Green/yellow  [] Green/black [] on ice  [] Grey  [] on ice  [] Yellow  [x] Red  [] Pink  [] Type/ Screen  [] ABG  [] VBG    [] COVID-19 swab    [] Rapid  [] PCR  [] Flu swab  [] Peds Viral Panel     [] Urine Sample  [] Fecal Sample  [] Pelvic Cultures  [] Blood Cultures  [] X 2  [] STREP Cultures  [] Wound Cultures   
The following labs were labeled with appropriate pt sticker and tubed to lab:     [] Blue     [x] Lavender   [] on ice  [x] Green/yellow  [] Green/black [] on ice  [] Grey  [] on ice  [] Yellow  [] Red  [] Pink  [] Type/ Screen  [] ABG  [] VBG    [] COVID-19 swab    [] Rapid  [] PCR  [] Flu swab  [] Peds Viral Panel     [] Urine Sample  [] Fecal Sample  [] Pelvic Cultures  [] Blood Cultures  [] X 2  [] STREP Cultures  [] Wound Cultures    
The following labs were labeled with appropriate pt sticker and tubed to lab:     [] Blue     [x] Lavender   [x] on ice  [] Green/yellow  [x] Green/black [x] on ice  [] Grey  [] on ice  [x] Yellow  [] Red  [] Pink  [] Type/ Screen  [] ABG  [] VBG    [] COVID-19 swab    [] Rapid  [] PCR  [] Flu swab  [] Peds Viral Panel     [] Urine Sample  [] Fecal Sample  [] Pelvic Cultures  [] Blood Cultures  [] X 2  [] STREP Cultures  [] Wound Cultures   
The following labs were labeled with appropriate pt sticker and tubed to lab:     [x] Blue     [x] Lavender   [] on ice  [x] Green/yellow  [x] Green/black [] on ice  [] Dunn  [] on ice  [x] Yellow  [x] Red  [] Pink  [] Type/ Screen  [] ABG  [] VBG    [] COVID-19 swab    [] Rapid  [] PCR  [] Flu swab  [] Peds Viral Panel     [] Urine Sample  [] Fecal Sample  [] Pelvic Cultures  [] Blood Cultures  [] X 2  [] STREP Cultures  [] Wound Cultures   
Xray at bedside   
67 12 96 %   04/06/25 2300 113/65 -- -- 76 12 97 %   04/06/25 2242 128/77 -- -- 79 17 99 %   04/06/25 2200 (!) 149/57 -- -- 75 15 98 %   04/06/25 2145 131/85 -- -- 75 14 99 %   04/06/25 2130 135/84 -- -- 82 10 100 %   04/06/25 2115 (!) 131/90 -- -- 84 12 98 %   04/06/25 2100 (!) 137/92 -- -- 80 -- 99 %   04/06/25 2045 137/82 -- -- 81 -- 99 %   04/06/25 2030 126/83 -- -- 89 25 97 %   04/06/25 2015 132/79 -- -- 85 21 97 %   04/06/25 2003 (!) 131/96 97.5 °F (36.4 °C) Oral 92 18 96 %   04/06/25 2000 -- -- -- 90 -- 96 %   04/06/25 1959 (!) 131/96 -- -- -- -- --      Visit Vitals  /61   Pulse 66   Temp 97.5 °F (36.4 °C) (Oral)   Resp 11   SpO2 95%        LDAs:   Peripheral IV 04/06/25 Left;Anterior Antecubital (Active)   Site Assessment Clean, dry & intact 04/06/25 2007   Line Status Blood return noted;Flushed 04/06/25 2007       Ambulatory Status:  Presents to emergency department  because of falls (Syncope, seizure, or loss of consciousness): Yes, Age > 70: No, Altered Mental Status, Intoxication with alcohol or substance confusion (Disorientation, impaired judgment, poor safety awaremess, or inability to follow instructions): No, Impaired Mobility: Ambulates or transfers with assistive devices or assistance; Unable to ambulate or transer.: No, Nursing Judgement: Yes    Diagnosis:  DISPOSITION Admitted 04/06/2025 08:58:05 PM   Final diagnoses:   Breakthrough seizure (HCC)        Consults:  IP CONSULT TO NEUROLOGY  IP CONSULT TO SOCIAL WORK     Treatment Team:   Treatment Team:   Jackie Azar, Jackie Ruiz DO Kasischke, Cassidy, RN    Treatment:  ED Course as of 04/07/25 0746   Sun Apr 06, 2025 2059 Neurology admitting patient. [BG]      ED Course User Index  [BG] Marek Fan MD          Skin Assessment:        Pain Score:  Pain Assessment  Pain Assessment: Face, Legs, Activity, Cry, and Consolability (FLACC)      SOCIAL HISTORY       Social History     Socioeconomic History    Marital status: 
      Electronically signed by Dede Garcia RN on 4/7/2025 at 11:45 AM

## 2025-04-07 NOTE — H&P
reference as unclear if episodes are seizures or behavioral. LTME as patient reportedly had prolonged event with reported medication compliance and no clear source of any provoking factors including infection     Plan:       Suspected breakthrough seizure (Lennox-Gastaut syndrome)  - EEG; can consider LTME due to back to back seizures with medication compliance and no known infectious source identified   - Discharge within 24 hours if remains seizure-free  - Continue Vimpat 150 mg twice daily, Depakote 1500 twice daily, clobazam 20 mg twice daily, Fycompa 4 mg twice daily  - On Lovenox for DVT prophylaxis  - Seizure precautions  - Ativan as needed: notify physician   - Neuro checks per protocol  - Can consider psych consult to rule out behavioral episodes    Active autistic disorder with IDD  - Continue risperidone 1 mg daily and 2 mg nightly  - Continue Abilify 15 mg daily      Consultations:   IP CONSULT TO NEUROLOGY    Chayo Walker MD  Neurology Resident   4/6/2025  8:24 PM    Copy sent to No primary care provider on file.

## 2025-04-08 VITALS
HEART RATE: 70 BPM | TEMPERATURE: 99.6 F | OXYGEN SATURATION: 95 % | BODY MASS INDEX: 29.09 KG/M2 | SYSTOLIC BLOOD PRESSURE: 103 MMHG | RESPIRATION RATE: 14 BRPM | DIASTOLIC BLOOD PRESSURE: 76 MMHG | WEIGHT: 214.51 LBS

## 2025-04-08 LAB
ANION GAP SERPL CALCULATED.3IONS-SCNC: 15 MMOL/L (ref 9–16)
BUN SERPL-MCNC: 14 MG/DL (ref 6–20)
CALCIUM SERPL-MCNC: 9.4 MG/DL (ref 8.6–10.4)
CHLORIDE SERPL-SCNC: 103 MMOL/L (ref 98–107)
CO2 SERPL-SCNC: 23 MMOL/L (ref 20–31)
CREAT SERPL-MCNC: 1 MG/DL (ref 0.7–1.2)
GFR, ESTIMATED: >90 ML/MIN/1.73M2
GLUCOSE SERPL-MCNC: 90 MG/DL (ref 74–99)
POTASSIUM SERPL-SCNC: 4 MMOL/L (ref 3.7–5.3)
SODIUM SERPL-SCNC: 141 MMOL/L (ref 136–145)

## 2025-04-08 PROCEDURE — 6360000002 HC RX W HCPCS

## 2025-04-08 PROCEDURE — 2500000003 HC RX 250 WO HCPCS

## 2025-04-08 PROCEDURE — 80048 BASIC METABOLIC PNL TOTAL CA: CPT

## 2025-04-08 PROCEDURE — 99239 HOSP IP/OBS DSCHRG MGMT >30: CPT | Performed by: PSYCHIATRY & NEUROLOGY

## 2025-04-08 PROCEDURE — 36415 COLL VENOUS BLD VENIPUNCTURE: CPT

## 2025-04-08 PROCEDURE — 6370000000 HC RX 637 (ALT 250 FOR IP)

## 2025-04-08 RX ORDER — PERAMPANEL 6 MG/1
6 TABLET ORAL 2 TIMES DAILY
Qty: 30 TABLET | Refills: 1 | Status: SHIPPED | OUTPATIENT
Start: 2025-04-08 | End: 2025-07-07

## 2025-04-08 RX ADMIN — CLOBAZAM 20 MG: 10 TABLET ORAL at 08:53

## 2025-04-08 RX ADMIN — DIVALPROEX SODIUM 1500 MG: 500 TABLET, DELAYED RELEASE ORAL at 08:54

## 2025-04-08 RX ADMIN — ENOXAPARIN SODIUM 40 MG: 100 INJECTION SUBCUTANEOUS at 08:54

## 2025-04-08 RX ADMIN — SODIUM CHLORIDE, PRESERVATIVE FREE 10 ML: 5 INJECTION INTRAVENOUS at 08:54

## 2025-04-08 RX ADMIN — FOLIC ACID 1000 MCG: 1 TABLET ORAL at 08:54

## 2025-04-08 RX ADMIN — PERAMPANEL 6 MG: 2 TABLET ORAL at 08:53

## 2025-04-08 RX ADMIN — ESCITALOPRAM 5 MG: 5 TABLET, FILM COATED ORAL at 08:53

## 2025-04-08 RX ADMIN — LACOSAMIDE 300 MG: 100 TABLET, FILM COATED ORAL at 08:54

## 2025-04-08 NOTE — CARE COORDINATION
Spoke with Clarice. Confirmed she was aware the patient was discharging to home today.     Discharge Report    [facility]  Clinical Case Management Department  Written by: MARIO POWELL    Patient Name: Jerrell Bell  Attending Provider: No att. providers found  Admit Date: 2025  7:54 PM  MRN: [id number]  Account: [id number]                     : 2000  Discharge Date: 2025      Disposition: home, has 24 hour home care.    MARIO POWELL

## 2025-04-08 NOTE — DISCHARGE INSTRUCTIONS
You were seen here for breakthrough seizure, continue taking your antiepileptics  as previously prescribed ,   -Fycompa dosage has been changed to 6mg , continue taking at the changed dose   Follow up with your neurologist within 2 weeks     If you begin to experience any symptoms such as chest pain shortness of breath nausea vomiting dizziness drowsiness abdominal pain loss of consciousness or any other symptoms you find concerning please come to the ED for follow-up evaluation.    If you have been given medication please take them as prescribed. Do not take more medication than recommended at any given time.     Please follow-up with your primary care provider within 5 to 7 days for continued care.     Please feel free return to the hospital if your symptoms worsen or any new concerning symptoms develop.  Follow-up with your primary care physician as needed for all other the concerns.

## 2025-04-08 NOTE — PLAN OF CARE
Problem: Discharge Planning  Goal: Discharge to home or other facility with appropriate resources  4/7/2025 2157 by Gayle Jones, RN  Outcome: Progressing  Flowsheets (Taken 4/7/2025 2157)  Discharge to home or other facility with appropriate resources:   Identify barriers to discharge with patient and caregiver   Arrange for needed discharge resources and transportation as appropriate   Identify discharge learning needs (meds, wound care, etc)  4/7/2025 1809 by Maura Park, RN  Outcome: Progressing  Flowsheets (Taken 4/7/2025 1330)  Discharge to home or other facility with appropriate resources: Identify barriers to discharge with patient and caregiver     
  Problem: Discharge Planning  Goal: Discharge to home or other facility with appropriate resources  4/8/2025 1405 by Gerardo Spangler, RN  Outcome: Adequate for Discharge  4/8/2025 1405 by Gerardo Spangler, RN  Outcome: Progressing     
  Problem: Discharge Planning  Goal: Discharge to home or other facility with appropriate resources  Outcome: Progressing  Flowsheets (Taken 4/7/2025 1330)  Discharge to home or other facility with appropriate resources: Identify barriers to discharge with patient and caregiver     
  Problem: Discharge Planning  Goal: Discharge to home or other facility with appropriate resources  Recent Flowsheet Documentation  Taken 4/7/2025 1330 by Maura Park RN  Discharge to home or other facility with appropriate resources: Identify barriers to discharge with patient and caregiver     
no

## 2025-04-08 NOTE — PROGRESS NOTES
[facility] Neurology   IN-PATIENT SERVICE  General Neurology Progress Note   [facility]                Date:   4/7/2025  Patient name:  Jerrell Bell  Date of admission:  4/6/2025  7:54 PM  MRN:   [id number]  Account:  [id number]  YOB: 2000  PCP:    No primary care provider on file.  Room:   02/02  Code Status:    Full Code  Chief Complaint:   Chief Complaint   Patient presents with    Seizures       Interval history      The patient was seen and examined at bedside this morning.     Labs, chart, and VS reviewed. No acute events overnight.  Patient becoming more awake, improving, however not back to his baseline yet.    Fycompa was increased from 4 BID to 6 mg BID.    Brief History     Jerrell Bell  25 y.o.  Non- / non  male with history of Lennox Gastaut on multiple AEDs who presents with breakthrough seizures. Was brought in from his group home by caregiver after a seizure lasting about 5 minutes, given versed by EMS. Was not given any intranasal medications by facility for unclear reasons. Recently obtained medication levels therapeutic 3/27/25, does have a history of breakthrough seizures secondary to medication non compliance. Caregiver present at bedside describes episodes of gasping and slight trembling which they state is typical of his usual episodes however was prolonged lasting over 5 minutes today with prolonged drowsiness. The episodes were aborted by versed on EMS arrival. He was gently lowered to the floor with no head trauma - no need for CT head. They endorse medication compliance, took his medications today as scheduled. Deny noticing any recent infectious symptoms.     On exam, patient not yet back to baseline - appears quite drowsy and caregivers at bedside confirm he is usually very alert, appears to be post ictal. Description of seizure somewhat atypical and a chart review reveals patient has numerous 
known infectious source identified   Discharge within 24 hours if remains seizure-free  Continue Vimpat 300 mg twice daily, Depakote 1500 twice daily, clobazam 20 mg twice daily  Increased Fycompa from 4 mg twice daily to 6 mg BID  Seizure precautions  Ativan as needed: notify physician   Neuro checks per protocol     Autism spectrum disorder, Intellectual disability  Continue risperidone 1 mg daily and 2 mg nightly  Continue Abilify 15 mg daily      PT/OT/SLP/Social work all consulted  Diet: Regular adult diet  DVT Prophylaxis: Lovenox 40 mg SC daily  Discharge Planning: home with home health     Patient to be discussed with attending physician, Dr. Jitendra Lord, DO Carine Reyna MD  Internal Medicine Resident, PGY-2  [facility]; [place], [place]  4/8/2025, 8:22 AM      Copy sent to Dr. Alba primary care provider on file.    This note is created with the assistance of a speech-recognition program. While intending to generate a document that actually reflects the content of the visit, the document can still have some errors including those of syntax and sound a- like substitutions which may escape proofreading. In such instances, actual meaning can be extrapolated by contextual derivation.

## 2025-04-09 LAB
LACOSAMIDE SERPL-MCNC: 8.4 UG/ML (ref 1–10)
VALPROIC ACID % FREE: 14 % (ref 5–18)
VALPROIC ACID TOTAL: 57 UG/ML (ref 50–125)
VALPROIC ACID, FREE: 8 UG/ML (ref 7–23)

## 2025-04-09 NOTE — DISCHARGE SUMMARY
[facility]     Department of Neurology    INPATIENT DISCHARGE SUMMARY        Patient Identification:  Jerrell Bell is a 25 y.o. male.  :  2000  MRN: [id number]     Acct: [id number]   Admit Date:  2025  Discharge date and time: 2025  3:30 PM   Attending Provider: No att. providers found                                     Admission Diagnoses:   Breakthrough seizure (HCC) [G40.919]    Discharge Diagnoses:   Principal Problem:    Breakthrough seizure (HCC)  Active Problems:    Intractable Lennox-Gastaut syndrome without status epilepticus (HCC)  Resolved Problems:    * No resolved hospital problems. *       Consults:   none    Brief Inpatient course:    This is a 25-year-old male with past medical history of Lennox Gastaut on multiple antiepileptics, he presented with breakthrough seizures,   Workup did not reveal any active metabolic or infectious etiology to lower seizure threshold  His Depakote levels were 107, while admitted in the hospital did not have any seizure episodes , his Fycompa was increased form 4mg to 6mg , otherwise he was continued on home regimen of Vimpat 300 Mg twice daily, Depakote 1500 Mg twice daily, clobazam 20 Mg twice daily        Patient is stable from neurological standpoint to be discharged.    Procedures:  none     Any Hospital Acquired Infections: none    Discharge Functional Status:  stable    Disposition: home    Patient Instructions:   You were seen here for breakthrough seizure, continue taking your antiepileptics  as previously prescribed ,   -Fycompa dosage has been changed to 6mg , continue taking at the changed dose   Follow up with your neurologist within 2 weeks     If you begin to experience any symptoms such as chest pain shortness of breath nausea vomiting dizziness drowsiness abdominal pain loss of consciousness or any other symptoms you find concerning please come to the ED for follow-up evaluation.    If you have been

## 2025-04-14 LAB
CLOBAZAM SERPL-MCNC: 296 NG/ML (ref 30–300)
NORCLOBAZAM SERPL-MCNC: 1397 NG/ML (ref 300–3000)

## 2025-05-13 ENCOUNTER — APPOINTMENT (OUTPATIENT)
Dept: GENERAL RADIOLOGY | Age: 25
End: 2025-05-13
Payer: MEDICAID

## 2025-05-13 ENCOUNTER — APPOINTMENT (OUTPATIENT)
Dept: CT IMAGING | Age: 25
End: 2025-05-13
Payer: MEDICAID

## 2025-05-13 ENCOUNTER — HOSPITAL ENCOUNTER (EMERGENCY)
Age: 25
Discharge: HOME | End: 2025-05-13
Attending: EMERGENCY MEDICINE
Payer: MEDICAID

## 2025-05-13 VITALS
BODY MASS INDEX: 29.12 KG/M2 | DIASTOLIC BLOOD PRESSURE: 92 MMHG | RESPIRATION RATE: 20 BRPM | WEIGHT: 215 LBS | HEART RATE: 88 BPM | HEIGHT: 72 IN | SYSTOLIC BLOOD PRESSURE: 124 MMHG | OXYGEN SATURATION: 97 % | TEMPERATURE: 99 F

## 2025-05-13 DIAGNOSIS — R56.9 SEIZURE (HCC): Primary | ICD-10-CM

## 2025-05-13 LAB
AMPHET UR QL SCN: NEGATIVE
ANION GAP SERPL CALCULATED.3IONS-SCNC: 18 MMOL/L (ref 9–16)
APAP SERPL-MCNC: <5 UG/ML (ref 10–30)
BARBITURATES UR QL SCN: NEGATIVE
BASOPHILS # BLD: <0.03 K/UL (ref 0–0.2)
BASOPHILS NFR BLD: 0 % (ref 0–2)
BENZODIAZ UR QL: POSITIVE
BUN SERPL-MCNC: 12 MG/DL (ref 6–20)
CALCIUM SERPL-MCNC: 9.4 MG/DL (ref 8.6–10.4)
CANNABINOIDS UR QL SCN: NEGATIVE
CHLORIDE SERPL-SCNC: 100 MMOL/L (ref 98–107)
CO2 SERPL-SCNC: 22 MMOL/L (ref 20–31)
COCAINE UR QL SCN: NEGATIVE
CREAT SERPL-MCNC: 0.9 MG/DL (ref 0.7–1.2)
EOSINOPHIL # BLD: 0.3 K/UL (ref 0–0.44)
EOSINOPHILS RELATIVE PERCENT: 6 % (ref 1–4)
ERYTHROCYTE [DISTWIDTH] IN BLOOD BY AUTOMATED COUNT: 12.5 % (ref 11.8–14.4)
ETHANOL PERCENT: NORMAL %
ETHANOLAMINE SERPL-MCNC: <10 MG/DL (ref 0–0.08)
FENTANYL UR QL: NEGATIVE
GFR, ESTIMATED: >90 ML/MIN/1.73M2
GLUCOSE SERPL-MCNC: 103 MG/DL (ref 74–99)
HCT VFR BLD AUTO: 45.8 % (ref 40.7–50.3)
HGB BLD-MCNC: 15.9 G/DL (ref 13–17)
IMM GRANULOCYTES # BLD AUTO: 0.02 K/UL (ref 0–0.3)
IMM GRANULOCYTES NFR BLD: 0 %
LYMPHOCYTES NFR BLD: 1.48 K/UL (ref 1.1–3.7)
LYMPHOCYTES RELATIVE PERCENT: 31 % (ref 24–43)
MAGNESIUM SERPL-MCNC: 1.8 MG/DL (ref 1.6–2.6)
MCH RBC QN AUTO: 32.8 PG (ref 25.2–33.5)
MCHC RBC AUTO-ENTMCNC: 34.7 G/DL (ref 28.4–34.8)
MCV RBC AUTO: 94.4 FL (ref 82.6–102.9)
METHADONE UR QL: NEGATIVE
MONOCYTES NFR BLD: 0.37 K/UL (ref 0.1–1.2)
MONOCYTES NFR BLD: 8 % (ref 3–12)
NEUTROPHILS NFR BLD: 54 % (ref 36–65)
NEUTS SEG NFR BLD: 2.59 K/UL (ref 1.5–8.1)
NRBC BLD-RTO: 0 PER 100 WBC
OPIATES UR QL SCN: NEGATIVE
OXYCODONE UR QL SCN: NEGATIVE
PCP UR QL SCN: NEGATIVE
PLATELET # BLD AUTO: 124 K/UL (ref 138–453)
PMV BLD AUTO: 9.8 FL (ref 8.1–13.5)
POTASSIUM SERPL-SCNC: 3.9 MMOL/L (ref 3.7–5.3)
RBC # BLD AUTO: 4.85 M/UL (ref 4.21–5.77)
SALICYLATES SERPL-MCNC: <0.5 MG/DL (ref 0–10)
SODIUM SERPL-SCNC: 139 MMOL/L (ref 136–145)
TEST INFORMATION: ABNORMAL
VALPROATE SERPL-MCNC: 70 UG/ML (ref 50–125)
WBC OTHER # BLD: 4.8 K/UL (ref 3.5–11.3)

## 2025-05-13 PROCEDURE — 99285 EMERGENCY DEPT VISIT HI MDM: CPT

## 2025-05-13 PROCEDURE — 2580000003 HC RX 258: Performed by: PHYSICIAN ASSISTANT

## 2025-05-13 PROCEDURE — 80235 DRUG ASSAY LACOSAMIDE: CPT

## 2025-05-13 PROCEDURE — 80164 ASSAY DIPROPYLACETIC ACD TOT: CPT

## 2025-05-13 PROCEDURE — G0480 DRUG TEST DEF 1-7 CLASSES: HCPCS

## 2025-05-13 PROCEDURE — 80179 DRUG ASSAY SALICYLATE: CPT

## 2025-05-13 PROCEDURE — 70450 CT HEAD/BRAIN W/O DYE: CPT

## 2025-05-13 PROCEDURE — 80143 DRUG ASSAY ACETAMINOPHEN: CPT

## 2025-05-13 PROCEDURE — 80048 BASIC METABOLIC PNL TOTAL CA: CPT

## 2025-05-13 PROCEDURE — 93005 ELECTROCARDIOGRAM TRACING: CPT | Performed by: PHYSICIAN ASSISTANT

## 2025-05-13 PROCEDURE — 6370000000 HC RX 637 (ALT 250 FOR IP): Performed by: PHYSICIAN ASSISTANT

## 2025-05-13 PROCEDURE — 83735 ASSAY OF MAGNESIUM: CPT

## 2025-05-13 PROCEDURE — 36415 COLL VENOUS BLD VENIPUNCTURE: CPT

## 2025-05-13 PROCEDURE — 71045 X-RAY EXAM CHEST 1 VIEW: CPT

## 2025-05-13 PROCEDURE — 80307 DRUG TEST PRSMV CHEM ANLYZR: CPT

## 2025-05-13 PROCEDURE — 85025 COMPLETE CBC W/AUTO DIFF WBC: CPT

## 2025-05-13 RX ORDER — DIVALPROEX SODIUM 500 MG/1
1500 TABLET, DELAYED RELEASE ORAL ONCE
Status: COMPLETED | OUTPATIENT
Start: 2025-05-13 | End: 2025-05-13

## 2025-05-13 RX ORDER — DIVALPROEX SODIUM 500 MG/1
1000 TABLET, FILM COATED, EXTENDED RELEASE ORAL ONCE
Status: DISCONTINUED | OUTPATIENT
Start: 2025-05-13 | End: 2025-05-13

## 2025-05-13 RX ORDER — DIVALPROEX SODIUM 500 MG/1
1500 TABLET, FILM COATED, EXTENDED RELEASE ORAL ONCE
Status: DISCONTINUED | OUTPATIENT
Start: 2025-05-13 | End: 2025-05-13 | Stop reason: CLARIF

## 2025-05-13 RX ORDER — 0.9 % SODIUM CHLORIDE 0.9 %
1000 INTRAVENOUS SOLUTION INTRAVENOUS ONCE
Status: COMPLETED | OUTPATIENT
Start: 2025-05-13 | End: 2025-05-13

## 2025-05-13 RX ADMIN — SODIUM CHLORIDE 1000 ML: 0.9 INJECTION, SOLUTION INTRAVENOUS at 09:53

## 2025-05-13 RX ADMIN — DIVALPROEX SODIUM 1500 MG: 500 TABLET, DELAYED RELEASE ORAL at 12:56

## 2025-05-13 NOTE — ED PROVIDER NOTES
eMERGENCY dEPARTMENT eNCOUnter   Independent Attestation     Pt Name: Jerrell eBll  MRN: [id number]  Birthdate 2000  Date of evaluation: 5/13/25     Jerrell Bell is a 25 y.o. male with CC: No chief complaint on file.      Based on the medical record the care appears appropriate.  I was personally available for consultation in the Emergency Department.    Jp Mcelroy MD  Attending Emergency Physician          Jp Mcelroy MD  05/13/25 [id number]       Jp Mcelroy MD  05/13/25 [id number]    
but occasionally words are mis-transcribed.)    PAM Harris, Kristopher Sun PA-C  05/13/25 [id number]

## 2025-05-13 NOTE — DISCHARGE INSTRUCTIONS
Take meds as prescribed.  Follow outpatient tomorrow with doctor or by calling [phone] or [phone].   Return to ER immediately if symptoms worsen or persist.

## 2025-05-14 LAB
EKG ATRIAL RATE: 103 BPM
EKG P AXIS: 66 DEGREES
EKG P-R INTERVAL: 162 MS
EKG Q-T INTERVAL: 346 MS
EKG QRS DURATION: 90 MS
EKG QTC CALCULATION (BAZETT): 453 MS
EKG R AXIS: 52 DEGREES
EKG T AXIS: 46 DEGREES
EKG VENTRICULAR RATE: 103 BPM

## 2025-05-15 LAB — LACOSAMIDE SERPL-MCNC: 10.2 UG/ML (ref 1–10)

## 2025-08-17 ENCOUNTER — HOSPITAL ENCOUNTER (EMERGENCY)
Age: 25
Discharge: HOME | End: 2025-08-18
Attending: EMERGENCY MEDICINE
Payer: MEDICAID

## 2025-08-17 VITALS
TEMPERATURE: 97.4 F | SYSTOLIC BLOOD PRESSURE: 109 MMHG | OXYGEN SATURATION: 94 % | RESPIRATION RATE: 14 BRPM | DIASTOLIC BLOOD PRESSURE: 70 MMHG | HEART RATE: 71 BPM

## 2025-08-17 DIAGNOSIS — G40.919 BREAKTHROUGH SEIZURE (HCC): Primary | ICD-10-CM

## 2025-08-17 LAB
ANION GAP SERPL CALCULATED.3IONS-SCNC: 15 MMOL/L (ref 9–16)
BASOPHILS # BLD: <0.03 K/UL (ref 0–0.2)
BASOPHILS NFR BLD: 0 % (ref 0–2)
BUN SERPL-MCNC: 9 MG/DL (ref 6–20)
CALCIUM SERPL-MCNC: 9.1 MG/DL (ref 8.6–10.4)
CHLORIDE SERPL-SCNC: 105 MMOL/L (ref 98–107)
CO2 SERPL-SCNC: 21 MMOL/L (ref 20–31)
CREAT SERPL-MCNC: 1 MG/DL (ref 0.7–1.2)
EOSINOPHIL # BLD: 0.31 K/UL (ref 0–0.44)
EOSINOPHILS RELATIVE PERCENT: 5 % (ref 1–4)
ERYTHROCYTE [DISTWIDTH] IN BLOOD BY AUTOMATED COUNT: 11.9 % (ref 11.8–14.4)
GFR, ESTIMATED: >90 ML/MIN/1.73M2
GLUCOSE SERPL-MCNC: 165 MG/DL (ref 74–99)
HCT VFR BLD AUTO: 41.8 % (ref 40.7–50.3)
HGB BLD-MCNC: 14.5 G/DL (ref 13–17)
IMM GRANULOCYTES # BLD AUTO: 0.03 K/UL (ref 0–0.3)
IMM GRANULOCYTES NFR BLD: 0 %
LYMPHOCYTES NFR BLD: 1.88 K/UL (ref 1.1–3.7)
LYMPHOCYTES RELATIVE PERCENT: 28 % (ref 24–43)
MCH RBC QN AUTO: 32.7 PG (ref 25.2–33.5)
MCHC RBC AUTO-ENTMCNC: 34.7 G/DL (ref 28.4–34.8)
MCV RBC AUTO: 94.4 FL (ref 82.6–102.9)
MONOCYTES NFR BLD: 0.6 K/UL (ref 0.1–1.2)
MONOCYTES NFR BLD: 9 % (ref 3–12)
NEUTROPHILS NFR BLD: 58 % (ref 36–65)
NEUTS SEG NFR BLD: 3.84 K/UL (ref 1.5–8.1)
NRBC BLD-RTO: 0 PER 100 WBC
PLATELET # BLD AUTO: ABNORMAL K/UL (ref 138–453)
PLATELET, FLUORESCENCE: 131 K/UL (ref 138–453)
PLATELETS.RETICULATED NFR BLD AUTO: 3 % (ref 1.1–10.3)
POTASSIUM SERPL-SCNC: 3.8 MMOL/L (ref 3.7–5.3)
RBC # BLD AUTO: 4.43 M/UL (ref 4.21–5.77)
SODIUM SERPL-SCNC: 141 MMOL/L (ref 136–145)
VALPROATE SERPL-MCNC: 80 UG/ML (ref 50–125)
WBC OTHER # BLD: 6.7 K/UL (ref 3.5–11.3)

## 2025-08-17 PROCEDURE — 99283 EMERGENCY DEPT VISIT LOW MDM: CPT

## 2025-08-17 PROCEDURE — 80048 BASIC METABOLIC PNL TOTAL CA: CPT

## 2025-08-17 PROCEDURE — 85055 RETICULATED PLATELET ASSAY: CPT

## 2025-08-17 PROCEDURE — 85025 COMPLETE CBC W/AUTO DIFF WBC: CPT

## 2025-08-17 PROCEDURE — 93005 ELECTROCARDIOGRAM TRACING: CPT | Performed by: EMERGENCY MEDICINE

## 2025-08-17 PROCEDURE — 80235 DRUG ASSAY LACOSAMIDE: CPT

## 2025-08-17 PROCEDURE — 80164 ASSAY DIPROPYLACETIC ACD TOT: CPT

## 2025-08-17 RX ORDER — DIAZEPAM 5 MG/100UL
5 SPRAY NASAL PRN
Qty: 1 EACH | Refills: 0 | Status: SHIPPED | OUTPATIENT
Start: 2025-08-17 | End: 2025-08-18

## 2025-08-17 ASSESSMENT — PAIN - FUNCTIONAL ASSESSMENT: PAIN_FUNCTIONAL_ASSESSMENT: 0-10

## 2025-08-17 ASSESSMENT — PAIN SCALES - GENERAL: PAINLEVEL_OUTOF10: 5

## 2025-08-17 ASSESSMENT — LIFESTYLE VARIABLES
HOW OFTEN DO YOU HAVE A DRINK CONTAINING ALCOHOL: PATIENT DECLINED
HOW MANY STANDARD DRINKS CONTAINING ALCOHOL DO YOU HAVE ON A TYPICAL DAY: PATIENT DECLINED

## 2025-08-18 RX ORDER — DIAZEPAM 5 MG/100UL
5 SPRAY NASAL PRN
Qty: 1 EACH | Refills: 0 | Status: SHIPPED | OUTPATIENT
Start: 2025-08-18

## 2025-08-20 LAB
EKG ATRIAL RATE: 102 BPM
EKG P AXIS: 40 DEGREES
EKG P-R INTERVAL: 152 MS
EKG Q-T INTERVAL: 332 MS
EKG QRS DURATION: 90 MS
EKG QTC CALCULATION (BAZETT): 432 MS
EKG R AXIS: 6 DEGREES
EKG T AXIS: 23 DEGREES
EKG VENTRICULAR RATE: 102 BPM
LACOSAMIDE SERPL-MCNC: 12.4 UG/ML (ref 1–10)

## 2025-08-20 PROCEDURE — 93010 ELECTROCARDIOGRAM REPORT: CPT | Performed by: INTERNAL MEDICINE

## 2025-09-02 ENCOUNTER — APPOINTMENT (OUTPATIENT)
Dept: CT IMAGING | Age: 25
End: 2025-09-02
Payer: MEDICAID

## 2025-09-02 ENCOUNTER — HOSPITAL ENCOUNTER (OUTPATIENT)
Age: 25
Setting detail: OBSERVATION
Discharge: OTHER INSTITUTION | End: 2025-09-04
Admitting: FAMILY MEDICINE
Payer: MEDICAID

## 2025-09-02 DIAGNOSIS — G40.812 NONINTRACTABLE LENNOX-GASTAUT SYNDROME WITHOUT STATUS EPILEPTICUS (HCC): ICD-10-CM

## 2025-09-02 DIAGNOSIS — S00.81XA ABRASION OF FACE, INITIAL ENCOUNTER: ICD-10-CM

## 2025-09-02 DIAGNOSIS — G40.919 BREAKTHROUGH SEIZURE (HCC): Primary | ICD-10-CM

## 2025-09-02 DIAGNOSIS — S09.90XA CLOSED HEAD INJURY, INITIAL ENCOUNTER: ICD-10-CM

## 2025-09-02 LAB
ALBUMIN SERPL-MCNC: 4.2 G/DL (ref 3.5–5.2)
ALBUMIN/GLOB SERPL: 1.7 {RATIO} (ref 1–2.5)
ALP SERPL-CCNC: 53 U/L (ref 40–129)
ALT SERPL-CCNC: 51 U/L (ref 10–50)
ANION GAP SERPL CALCULATED.3IONS-SCNC: 23 MMOL/L (ref 9–16)
AST SERPL-CCNC: 58 U/L (ref 10–50)
BASOPHILS # BLD: <0.03 K/UL (ref 0–0.2)
BASOPHILS NFR BLD: 1 % (ref 0–2)
BILIRUB SERPL-MCNC: 0.6 MG/DL (ref 0–1.2)
BILIRUB UR QL STRIP: NEGATIVE
BUN SERPL-MCNC: 9 MG/DL (ref 6–20)
CALCIUM SERPL-MCNC: 9.2 MG/DL (ref 8.6–10.4)
CHLORIDE SERPL-SCNC: 101 MMOL/L (ref 98–107)
CLARITY UR: CLEAR
CO2 SERPL-SCNC: 16 MMOL/L (ref 20–31)
COLOR UR: YELLOW
CREAT SERPL-MCNC: 1.1 MG/DL (ref 0.7–1.2)
EOSINOPHIL # BLD: 0.31 K/UL (ref 0–0.44)
EOSINOPHILS RELATIVE PERCENT: 7 % (ref 1–4)
ERYTHROCYTE [DISTWIDTH] IN BLOOD BY AUTOMATED COUNT: 12.7 % (ref 11.8–14.4)
GFR, ESTIMATED: >90 ML/MIN/1.73M2
GLUCOSE SERPL-MCNC: 92 MG/DL (ref 74–99)
GLUCOSE UR STRIP-MCNC: NEGATIVE MG/DL
HCT VFR BLD AUTO: 38.5 % (ref 40.7–50.3)
HGB BLD-MCNC: 13.6 G/DL (ref 13–17)
HGB UR QL STRIP.AUTO: NEGATIVE
IMM GRANULOCYTES # BLD AUTO: 0.05 K/UL (ref 0–0.3)
IMM GRANULOCYTES NFR BLD: 1 %
KETONES UR STRIP-MCNC: ABNORMAL MG/DL
LEUKOCYTE ESTERASE UR QL STRIP: NEGATIVE
LYMPHOCYTES NFR BLD: 1.46 K/UL (ref 1.1–3.7)
LYMPHOCYTES RELATIVE PERCENT: 34 % (ref 24–43)
MCH RBC QN AUTO: 33.4 PG (ref 25.2–33.5)
MCHC RBC AUTO-ENTMCNC: 35.3 G/DL (ref 28.4–34.8)
MCV RBC AUTO: 94.6 FL (ref 82.6–102.9)
MONOCYTES NFR BLD: 0.36 K/UL (ref 0.1–1.2)
MONOCYTES NFR BLD: 9 % (ref 3–12)
NEUTROPHILS NFR BLD: 48 % (ref 36–65)
NEUTS SEG NFR BLD: 2.04 K/UL (ref 1.5–8.1)
NITRITE UR QL STRIP: NEGATIVE
NRBC BLD-RTO: 0 PER 100 WBC
PH UR STRIP: 7.5 [PH] (ref 5–8)
PLATELET # BLD AUTO: 126 K/UL (ref 138–453)
PMV BLD AUTO: 9.5 FL (ref 8.1–13.5)
POTASSIUM SERPL-SCNC: 3.8 MMOL/L (ref 3.7–5.3)
PROT SERPL-MCNC: 6.6 G/DL (ref 6.6–8.7)
PROT UR STRIP-MCNC: NEGATIVE MG/DL
RBC # BLD AUTO: 4.07 M/UL (ref 4.21–5.77)
SODIUM SERPL-SCNC: 139 MMOL/L (ref 136–145)
SP GR UR STRIP: 1.01 (ref 1–1.03)
UROBILINOGEN UR STRIP-ACNC: NORMAL EU/DL (ref 0–1)
VALPROATE SERPL-MCNC: 116 UG/ML (ref 50–125)
WBC OTHER # BLD: 4.2 K/UL (ref 3.5–11.3)

## 2025-09-02 PROCEDURE — G0378 HOSPITAL OBSERVATION PER HR: HCPCS

## 2025-09-02 PROCEDURE — 80235 DRUG ASSAY LACOSAMIDE: CPT

## 2025-09-02 PROCEDURE — 85025 COMPLETE CBC W/AUTO DIFF WBC: CPT

## 2025-09-02 PROCEDURE — 6360000002 HC RX W HCPCS: Performed by: PSYCHIATRY & NEUROLOGY

## 2025-09-02 PROCEDURE — 2580000003 HC RX 258: Performed by: NURSE PRACTITIONER

## 2025-09-02 PROCEDURE — 70450 CT HEAD/BRAIN W/O DYE: CPT

## 2025-09-02 PROCEDURE — 81003 URINALYSIS AUTO W/O SCOPE: CPT

## 2025-09-02 PROCEDURE — C9254 INJECTION, LACOSAMIDE: HCPCS | Performed by: PSYCHIATRY & NEUROLOGY

## 2025-09-02 PROCEDURE — 99285 EMERGENCY DEPT VISIT HI MDM: CPT

## 2025-09-02 PROCEDURE — 93005 ELECTROCARDIOGRAM TRACING: CPT

## 2025-09-02 PROCEDURE — 72125 CT NECK SPINE W/O DYE: CPT

## 2025-09-02 PROCEDURE — 80053 COMPREHEN METABOLIC PANEL: CPT

## 2025-09-02 PROCEDURE — 99221 1ST HOSP IP/OBS SF/LOW 40: CPT | Performed by: NURSE PRACTITIONER

## 2025-09-02 PROCEDURE — 80164 ASSAY DIPROPYLACETIC ACD TOT: CPT

## 2025-09-02 PROCEDURE — 99223 1ST HOSP IP/OBS HIGH 75: CPT | Performed by: PSYCHIATRY & NEUROLOGY

## 2025-09-02 PROCEDURE — 96365 THER/PROPH/DIAG IV INF INIT: CPT

## 2025-09-02 PROCEDURE — 6370000000 HC RX 637 (ALT 250 FOR IP): Performed by: NURSE PRACTITIONER

## 2025-09-02 PROCEDURE — 2580000003 HC RX 258: Performed by: PSYCHIATRY & NEUROLOGY

## 2025-09-02 RX ORDER — PERAMPANEL 4 MG/1
4 TABLET, FILM COATED ORAL NIGHTLY
COMMUNITY

## 2025-09-02 RX ORDER — RISPERIDONE 3 MG/1
3 TABLET ORAL NIGHTLY
Status: DISCONTINUED | OUTPATIENT
Start: 2025-09-02 | End: 2025-09-04 | Stop reason: HOSPADM

## 2025-09-02 RX ORDER — POTASSIUM CHLORIDE 7.45 MG/ML
10 INJECTION INTRAVENOUS PRN
Status: DISCONTINUED | OUTPATIENT
Start: 2025-09-02 | End: 2025-09-04 | Stop reason: HOSPADM

## 2025-09-02 RX ORDER — SODIUM CHLORIDE 0.9 % (FLUSH) 0.9 %
5-40 SYRINGE (ML) INJECTION EVERY 12 HOURS SCHEDULED
Status: DISCONTINUED | OUTPATIENT
Start: 2025-09-02 | End: 2025-09-04 | Stop reason: HOSPADM

## 2025-09-02 RX ORDER — CLOBAZAM 10 MG/1
20 TABLET ORAL 2 TIMES DAILY
Status: DISCONTINUED | OUTPATIENT
Start: 2025-09-02 | End: 2025-09-04 | Stop reason: HOSPADM

## 2025-09-02 RX ORDER — BENZTROPINE MESYLATE 1 MG/1
1 TABLET ORAL 2 TIMES DAILY
COMMUNITY

## 2025-09-02 RX ORDER — ARIPIPRAZOLE 5 MG/1
15 TABLET ORAL
Status: DISCONTINUED | OUTPATIENT
Start: 2025-09-02 | End: 2025-09-04 | Stop reason: HOSPADM

## 2025-09-02 RX ORDER — MAGNESIUM SULFATE IN WATER 40 MG/ML
2000 INJECTION, SOLUTION INTRAVENOUS PRN
Status: DISCONTINUED | OUTPATIENT
Start: 2025-09-02 | End: 2025-09-04 | Stop reason: HOSPADM

## 2025-09-02 RX ORDER — CANNABIDIOL 100 MG/ML
SOLUTION ORAL SEE ADMIN INSTRUCTIONS
COMMUNITY
Start: 2025-09-13

## 2025-09-02 RX ORDER — ENOXAPARIN SODIUM 100 MG/ML
40 INJECTION SUBCUTANEOUS DAILY
Status: DISCONTINUED | OUTPATIENT
Start: 2025-09-03 | End: 2025-09-04 | Stop reason: HOSPADM

## 2025-09-02 RX ORDER — ONDANSETRON 4 MG/1
4 TABLET, ORALLY DISINTEGRATING ORAL EVERY 8 HOURS PRN
COMMUNITY

## 2025-09-02 RX ORDER — RISPERIDONE 1 MG/ML
3 SOLUTION ORAL NIGHTLY
COMMUNITY

## 2025-09-02 RX ORDER — SODIUM CHLORIDE 0.9 % (FLUSH) 0.9 %
10 SYRINGE (ML) INJECTION PRN
Status: DISCONTINUED | OUTPATIENT
Start: 2025-09-02 | End: 2025-09-04 | Stop reason: HOSPADM

## 2025-09-02 RX ORDER — CLOBAZAM 20 MG/1
5 TABLET ORAL 2 TIMES DAILY
Status: ON HOLD | COMMUNITY
End: 2025-09-04 | Stop reason: HOSPADM

## 2025-09-02 RX ORDER — PERAMPANEL 2 MG/1
4 TABLET, FILM COATED ORAL DAILY
Status: DISCONTINUED | OUTPATIENT
Start: 2025-09-03 | End: 2025-09-04 | Stop reason: HOSPADM

## 2025-09-02 RX ORDER — HYDROCORTISONE 25 MG/G
CREAM TOPICAL
Status: ON HOLD | COMMUNITY
End: 2025-09-04 | Stop reason: HOSPADM

## 2025-09-02 RX ORDER — RISPERIDONE 1 MG/ML
1 SOLUTION ORAL
Status: DISCONTINUED | OUTPATIENT
Start: 2025-09-02 | End: 2025-09-02 | Stop reason: CLARIF

## 2025-09-02 RX ORDER — FOLIC ACID 1 MG/1
1000 TABLET ORAL DAILY
Status: DISCONTINUED | OUTPATIENT
Start: 2025-09-03 | End: 2025-09-04 | Stop reason: HOSPADM

## 2025-09-02 RX ORDER — ESCITALOPRAM OXALATE 10 MG/1
10 TABLET ORAL DAILY
Status: DISCONTINUED | OUTPATIENT
Start: 2025-09-03 | End: 2025-09-04 | Stop reason: HOSPADM

## 2025-09-02 RX ORDER — ACETAMINOPHEN 650 MG/1
650 SUPPOSITORY RECTAL EVERY 6 HOURS PRN
Status: DISCONTINUED | OUTPATIENT
Start: 2025-09-02 | End: 2025-09-04 | Stop reason: HOSPADM

## 2025-09-02 RX ORDER — SODIUM CHLORIDE 9 MG/ML
INJECTION, SOLUTION INTRAVENOUS PRN
Status: DISCONTINUED | OUTPATIENT
Start: 2025-09-02 | End: 2025-09-04 | Stop reason: HOSPADM

## 2025-09-02 RX ORDER — DIVALPROEX SODIUM 250 MG/1
250 TABLET, DELAYED RELEASE ORAL 2 TIMES DAILY
COMMUNITY

## 2025-09-02 RX ORDER — BENZTROPINE MESYLATE 1 MG/1
1 TABLET ORAL 2 TIMES DAILY
Status: DISCONTINUED | OUTPATIENT
Start: 2025-09-02 | End: 2025-09-04 | Stop reason: HOSPADM

## 2025-09-02 RX ORDER — ESCITALOPRAM OXALATE 10 MG/1
10 TABLET ORAL DAILY
COMMUNITY

## 2025-09-02 RX ORDER — ONDANSETRON 2 MG/ML
4 INJECTION INTRAMUSCULAR; INTRAVENOUS EVERY 6 HOURS PRN
Status: DISCONTINUED | OUTPATIENT
Start: 2025-09-02 | End: 2025-09-04 | Stop reason: HOSPADM

## 2025-09-02 RX ORDER — POTASSIUM CHLORIDE 1500 MG/1
40 TABLET, EXTENDED RELEASE ORAL PRN
Status: DISCONTINUED | OUTPATIENT
Start: 2025-09-02 | End: 2025-09-04 | Stop reason: HOSPADM

## 2025-09-02 RX ORDER — ONDANSETRON 4 MG/1
4 TABLET, ORALLY DISINTEGRATING ORAL EVERY 8 HOURS PRN
Status: DISCONTINUED | OUTPATIENT
Start: 2025-09-02 | End: 2025-09-04 | Stop reason: HOSPADM

## 2025-09-02 RX ORDER — DIVALPROEX SODIUM 500 MG/1
1500 TABLET, DELAYED RELEASE ORAL 2 TIMES DAILY
Status: DISCONTINUED | OUTPATIENT
Start: 2025-09-02 | End: 2025-09-04 | Stop reason: HOSPADM

## 2025-09-02 RX ORDER — LORAZEPAM 2 MG/ML
1 INJECTION INTRAMUSCULAR EVERY 5 MIN PRN
Status: DISCONTINUED | OUTPATIENT
Start: 2025-09-02 | End: 2025-09-04 | Stop reason: HOSPADM

## 2025-09-02 RX ORDER — ACETAMINOPHEN 325 MG/1
650 TABLET ORAL EVERY 6 HOURS PRN
Status: DISCONTINUED | OUTPATIENT
Start: 2025-09-02 | End: 2025-09-04 | Stop reason: HOSPADM

## 2025-09-02 RX ORDER — DIVALPROEX SODIUM 250 MG/1
250 TABLET, DELAYED RELEASE ORAL 2 TIMES DAILY
Status: DISCONTINUED | OUTPATIENT
Start: 2025-09-02 | End: 2025-09-04 | Stop reason: HOSPADM

## 2025-09-02 RX ORDER — RISPERIDONE 1 MG/ML
0.5 SOLUTION ORAL 3 TIMES DAILY PRN
COMMUNITY

## 2025-09-02 RX ORDER — SODIUM CHLORIDE 9 MG/ML
INJECTION, SOLUTION INTRAVENOUS CONTINUOUS
Status: ACTIVE | OUTPATIENT
Start: 2025-09-02 | End: 2025-09-03

## 2025-09-02 RX ORDER — POLYETHYLENE GLYCOL 3350 17 G/17G
17 POWDER, FOR SOLUTION ORAL DAILY PRN
Status: DISCONTINUED | OUTPATIENT
Start: 2025-09-02 | End: 2025-09-04 | Stop reason: HOSPADM

## 2025-09-02 RX ADMIN — ARIPIPRAZOLE 15 MG: 5 TABLET ORAL at 18:31

## 2025-09-02 RX ADMIN — DIVALPROEX SODIUM 250 MG: 250 TABLET, DELAYED RELEASE ORAL at 21:46

## 2025-09-02 RX ADMIN — CLOBAZAM 20 MG: 10 TABLET ORAL at 21:47

## 2025-09-02 RX ADMIN — BENZTROPINE MESYLATE 1 MG: 1 TABLET ORAL at 21:47

## 2025-09-02 RX ADMIN — SODIUM CHLORIDE: 0.9 INJECTION, SOLUTION INTRAVENOUS at 17:09

## 2025-09-02 RX ADMIN — LACOSAMIDE 200 MG: 10 INJECTION INTRAVENOUS at 15:22

## 2025-09-02 RX ADMIN — RISPERIDONE 3 MG: 3 TABLET, FILM COATED ORAL at 21:46

## 2025-09-02 RX ADMIN — DIVALPROEX SODIUM 1500 MG: 500 TABLET, DELAYED RELEASE ORAL at 21:47

## 2025-09-02 ASSESSMENT — ENCOUNTER SYMPTOMS: NAUSEA: 1

## 2025-09-02 ASSESSMENT — PAIN SCALES - GENERAL: PAINLEVEL_OUTOF10: 0

## 2025-09-03 PROBLEM — S09.90XA CLOSED HEAD INJURY: Status: ACTIVE | Noted: 2025-09-03

## 2025-09-03 PROBLEM — S00.81XA ABRASION OF FACE: Status: ACTIVE | Noted: 2025-09-03

## 2025-09-03 LAB
ANION GAP SERPL CALCULATED.3IONS-SCNC: 11 MMOL/L (ref 9–16)
BUN SERPL-MCNC: 10 MG/DL (ref 6–20)
CALCIUM SERPL-MCNC: 8.8 MG/DL (ref 8.6–10.4)
CHLORIDE SERPL-SCNC: 107 MMOL/L (ref 98–107)
CO2 SERPL-SCNC: 24 MMOL/L (ref 20–31)
CREAT SERPL-MCNC: 0.9 MG/DL (ref 0.7–1.2)
GFR, ESTIMATED: >90 ML/MIN/1.73M2
GLUCOSE SERPL-MCNC: 78 MG/DL (ref 74–99)
POTASSIUM SERPL-SCNC: 4 MMOL/L (ref 3.7–5.3)
SODIUM SERPL-SCNC: 143 MMOL/L (ref 136–145)

## 2025-09-03 PROCEDURE — C9254 INJECTION, LACOSAMIDE: HCPCS | Performed by: NURSE PRACTITIONER

## 2025-09-03 PROCEDURE — G0378 HOSPITAL OBSERVATION PER HR: HCPCS

## 2025-09-03 PROCEDURE — 6370000000 HC RX 637 (ALT 250 FOR IP): Performed by: NURSE PRACTITIONER

## 2025-09-03 PROCEDURE — 99232 SBSQ HOSP IP/OBS MODERATE 35: CPT | Performed by: PSYCHIATRY & NEUROLOGY

## 2025-09-03 PROCEDURE — 2500000003 HC RX 250 WO HCPCS: Performed by: NURSE PRACTITIONER

## 2025-09-03 PROCEDURE — 2580000003 HC RX 258: Performed by: NURSE PRACTITIONER

## 2025-09-03 PROCEDURE — 96366 THER/PROPH/DIAG IV INF ADDON: CPT

## 2025-09-03 PROCEDURE — 36415 COLL VENOUS BLD VENIPUNCTURE: CPT

## 2025-09-03 PROCEDURE — 80048 BASIC METABOLIC PNL TOTAL CA: CPT

## 2025-09-03 PROCEDURE — 6360000002 HC RX W HCPCS: Performed by: NURSE PRACTITIONER

## 2025-09-03 PROCEDURE — 99232 SBSQ HOSP IP/OBS MODERATE 35: CPT | Performed by: FAMILY MEDICINE

## 2025-09-03 PROCEDURE — 95819 EEG AWAKE AND ASLEEP: CPT

## 2025-09-03 RX ADMIN — ESCITALOPRAM OXALATE 10 MG: 10 TABLET ORAL at 08:11

## 2025-09-03 RX ADMIN — BENZTROPINE MESYLATE 1 MG: 1 TABLET ORAL at 20:13

## 2025-09-03 RX ADMIN — CLOBAZAM 20 MG: 10 TABLET ORAL at 20:12

## 2025-09-03 RX ADMIN — ARIPIPRAZOLE 15 MG: 5 TABLET ORAL at 17:06

## 2025-09-03 RX ADMIN — DIVALPROEX SODIUM 1500 MG: 500 TABLET, DELAYED RELEASE ORAL at 08:11

## 2025-09-03 RX ADMIN — CLOBAZAM 20 MG: 10 TABLET ORAL at 08:11

## 2025-09-03 RX ADMIN — SODIUM CHLORIDE, PRESERVATIVE FREE 10 ML: 5 INJECTION INTRAVENOUS at 08:11

## 2025-09-03 RX ADMIN — LACOSAMIDE 200 MG: 10 INJECTION INTRAVENOUS at 07:58

## 2025-09-03 RX ADMIN — FOLIC ACID 1000 MCG: 1 TABLET ORAL at 08:11

## 2025-09-03 RX ADMIN — LACOSAMIDE 200 MG: 10 INJECTION INTRAVENOUS at 20:19

## 2025-09-03 RX ADMIN — BENZTROPINE MESYLATE 1 MG: 1 TABLET ORAL at 08:11

## 2025-09-03 RX ADMIN — DIVALPROEX SODIUM 250 MG: 250 TABLET, DELAYED RELEASE ORAL at 08:11

## 2025-09-03 RX ADMIN — DIVALPROEX SODIUM 250 MG: 250 TABLET, DELAYED RELEASE ORAL at 20:12

## 2025-09-03 RX ADMIN — SODIUM CHLORIDE: 0.9 INJECTION, SOLUTION INTRAVENOUS at 12:48

## 2025-09-03 RX ADMIN — PERAMPANEL 4 MG: 2 TABLET ORAL at 20:13

## 2025-09-03 RX ADMIN — DIVALPROEX SODIUM 1500 MG: 500 TABLET, DELAYED RELEASE ORAL at 20:12

## 2025-09-03 RX ADMIN — RISPERIDONE 3 MG: 3 TABLET, FILM COATED ORAL at 20:12

## 2025-09-03 ASSESSMENT — PAIN SCALES - GENERAL
PAINLEVEL_OUTOF10: 0
PAINLEVEL_OUTOF10: 0

## 2025-09-04 VITALS
RESPIRATION RATE: 16 BRPM | BODY MASS INDEX: 29.8 KG/M2 | OXYGEN SATURATION: 96 % | HEART RATE: 48 BPM | SYSTOLIC BLOOD PRESSURE: 131 MMHG | TEMPERATURE: 97.2 F | HEIGHT: 72 IN | WEIGHT: 220 LBS | DIASTOLIC BLOOD PRESSURE: 86 MMHG

## 2025-09-04 LAB
EKG ATRIAL RATE: 95 BPM
EKG P AXIS: 56 DEGREES
EKG P-R INTERVAL: 160 MS
EKG Q-T INTERVAL: 362 MS
EKG QRS DURATION: 92 MS
EKG QTC CALCULATION (BAZETT): 454 MS
EKG R AXIS: 20 DEGREES
EKG T AXIS: 34 DEGREES
EKG VENTRICULAR RATE: 95 BPM
LACOSAMIDE SERPL-MCNC: 4.3 UG/ML (ref 1–10)

## 2025-09-04 PROCEDURE — 96372 THER/PROPH/DIAG INJ SC/IM: CPT

## 2025-09-04 PROCEDURE — 6360000002 HC RX W HCPCS: Performed by: NURSE PRACTITIONER

## 2025-09-04 PROCEDURE — G0378 HOSPITAL OBSERVATION PER HR: HCPCS

## 2025-09-04 PROCEDURE — 6370000000 HC RX 637 (ALT 250 FOR IP): Performed by: NURSE PRACTITIONER

## 2025-09-04 PROCEDURE — 99238 HOSP IP/OBS DSCHRG MGMT 30/<: CPT | Performed by: FAMILY MEDICINE

## 2025-09-04 PROCEDURE — 99232 SBSQ HOSP IP/OBS MODERATE 35: CPT | Performed by: PSYCHIATRY & NEUROLOGY

## 2025-09-04 RX ORDER — LACOSAMIDE 150 MG/1
300 TABLET ORAL 2 TIMES DAILY
Qty: 60 TABLET | Refills: 1 | Status: SHIPPED | OUTPATIENT
Start: 2025-09-04 | End: 2025-10-04

## 2025-09-04 RX ORDER — LACOSAMIDE 200 MG/1
200 TABLET ORAL 2 TIMES DAILY
Qty: 60 TABLET | Refills: 3 | Status: SHIPPED
Start: 2025-09-04 | End: 2025-09-04 | Stop reason: HOSPADM

## 2025-09-04 RX ORDER — CLOBAZAM 20 MG/1
20 TABLET ORAL 2 TIMES DAILY
Qty: 60 TABLET | Refills: 0 | Status: SHIPPED | OUTPATIENT
Start: 2025-09-04 | End: 2025-10-04

## 2025-09-04 RX ORDER — LACOSAMIDE 100 MG/1
300 TABLET ORAL 2 TIMES DAILY
Status: DISCONTINUED | OUTPATIENT
Start: 2025-09-04 | End: 2025-09-04 | Stop reason: HOSPADM

## 2025-09-04 RX ADMIN — ESCITALOPRAM OXALATE 10 MG: 10 TABLET ORAL at 10:03

## 2025-09-04 RX ADMIN — CLOBAZAM 20 MG: 10 TABLET ORAL at 10:03

## 2025-09-04 RX ADMIN — DIVALPROEX SODIUM 1500 MG: 500 TABLET, DELAYED RELEASE ORAL at 10:03

## 2025-09-04 RX ADMIN — FOLIC ACID 1000 MCG: 1 TABLET ORAL at 10:03

## 2025-09-04 RX ADMIN — ENOXAPARIN SODIUM 40 MG: 100 INJECTION SUBCUTANEOUS at 10:03

## 2025-09-04 RX ADMIN — BENZTROPINE MESYLATE 1 MG: 1 TABLET ORAL at 10:03

## 2025-09-04 RX ADMIN — DIVALPROEX SODIUM 250 MG: 250 TABLET, DELAYED RELEASE ORAL at 10:03

## 2025-09-04 ASSESSMENT — ENCOUNTER SYMPTOMS
ABDOMINAL PAIN: 0
RHINORRHEA: 0
VOICE CHANGE: 0
CHOKING: 0
VOMITING: 0
SHORTNESS OF BREATH: 0
CONSTIPATION: 0
SINUS PRESSURE: 0
COUGH: 0
WHEEZING: 0
CHEST TIGHTNESS: 0
BACK PAIN: 0